# Patient Record
Sex: MALE | Race: BLACK OR AFRICAN AMERICAN | NOT HISPANIC OR LATINO | URBAN - METROPOLITAN AREA
[De-identification: names, ages, dates, MRNs, and addresses within clinical notes are randomized per-mention and may not be internally consistent; named-entity substitution may affect disease eponyms.]

---

## 2019-04-06 ENCOUNTER — INPATIENT (INPATIENT)
Facility: HOSPITAL | Age: 29
LOS: 7 days | Discharge: HOME HEALTH | End: 2019-04-14
Attending: HOSPITALIST | Admitting: HOSPITALIST
Payer: MEDICARE

## 2019-04-06 VITALS
TEMPERATURE: 101 F | DIASTOLIC BLOOD PRESSURE: 121 MMHG | OXYGEN SATURATION: 100 % | RESPIRATION RATE: 16 BRPM | HEART RATE: 113 BPM | SYSTOLIC BLOOD PRESSURE: 148 MMHG

## 2019-04-06 DIAGNOSIS — R79.89 OTHER SPECIFIED ABNORMAL FINDINGS OF BLOOD CHEMISTRY: ICD-10-CM

## 2019-04-06 DIAGNOSIS — I16.0 HYPERTENSIVE URGENCY: ICD-10-CM
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
## 2019-04-06 DIAGNOSIS — Z29.9 ENCOUNTER FOR PROPHYLACTIC MEASURES, UNSPECIFIED: ICD-10-CM

## 2019-04-06 DIAGNOSIS — E87.2 ACIDOSIS: ICD-10-CM

## 2019-04-06 DIAGNOSIS — G93.40 ENCEPHALOPATHY, UNSPECIFIED: ICD-10-CM

## 2019-04-06 DIAGNOSIS — R65.10 SYSTEMIC INFLAMMATORY RESPONSE SYNDROME (SIRS) OF NON-INFECTIOUS ORIGIN WITHOUT ACUTE ORGAN DYSFUNCTION: ICD-10-CM

## 2019-04-06 DIAGNOSIS — R50.9 FEVER, UNSPECIFIED: ICD-10-CM

## 2019-04-06 LAB
ALBUMIN SERPL ELPH-MCNC: 4 G/DL — SIGNIFICANT CHANGE UP (ref 3.3–5)
ALP SERPL-CCNC: 111 U/L — SIGNIFICANT CHANGE UP (ref 40–120)
ALT FLD-CCNC: 28 U/L — SIGNIFICANT CHANGE UP (ref 4–41)
ANION GAP SERPL CALC-SCNC: 16 MMO/L — HIGH (ref 7–14)
APPEARANCE UR: CLEAR — SIGNIFICANT CHANGE UP
AST SERPL-CCNC: 30 U/L — SIGNIFICANT CHANGE UP (ref 4–40)
B PERT DNA SPEC QL NAA+PROBE: NOT DETECTED — SIGNIFICANT CHANGE UP
BACTERIA # UR AUTO: NEGATIVE — SIGNIFICANT CHANGE UP
BASE EXCESS BLDV CALC-SCNC: -0.3 MMOL/L — SIGNIFICANT CHANGE UP
BASE EXCESS BLDV CALC-SCNC: -3.4 MMOL/L — SIGNIFICANT CHANGE UP
BASOPHILS # BLD AUTO: 0.07 K/UL — SIGNIFICANT CHANGE UP (ref 0–0.2)
BASOPHILS NFR BLD AUTO: 0.7 % — SIGNIFICANT CHANGE UP (ref 0–2)
BILIRUB SERPL-MCNC: 0.2 MG/DL — SIGNIFICANT CHANGE UP (ref 0.2–1.2)
BILIRUB UR-MCNC: NEGATIVE — SIGNIFICANT CHANGE UP
BLOOD GAS VENOUS - CREATININE: 1.22 MG/DL — SIGNIFICANT CHANGE UP (ref 0.5–1.3)
BLOOD UR QL VISUAL: SIGNIFICANT CHANGE UP
BUN SERPL-MCNC: 14 MG/DL — SIGNIFICANT CHANGE UP (ref 7–23)
C PNEUM DNA SPEC QL NAA+PROBE: NOT DETECTED — SIGNIFICANT CHANGE UP
CALCIUM SERPL-MCNC: 9.4 MG/DL — SIGNIFICANT CHANGE UP (ref 8.4–10.5)
CHLORIDE BLDV-SCNC: 110 MMOL/L — HIGH (ref 96–108)
CHLORIDE SERPL-SCNC: 108 MMOL/L — HIGH (ref 98–107)
CO2 SERPL-SCNC: 19 MMOL/L — LOW (ref 22–31)
COLOR SPEC: SIGNIFICANT CHANGE UP
CREAT SERPL-MCNC: 1.36 MG/DL — HIGH (ref 0.5–1.3)
EOSINOPHIL # BLD AUTO: 0.13 K/UL — SIGNIFICANT CHANGE UP (ref 0–0.5)
EOSINOPHIL NFR BLD AUTO: 1.2 % — SIGNIFICANT CHANGE UP (ref 0–6)
FLUAV H1 2009 PAND RNA SPEC QL NAA+PROBE: NOT DETECTED — SIGNIFICANT CHANGE UP
FLUAV H1 RNA SPEC QL NAA+PROBE: NOT DETECTED — SIGNIFICANT CHANGE UP
FLUAV H3 RNA SPEC QL NAA+PROBE: NOT DETECTED — SIGNIFICANT CHANGE UP
FLUAV SUBTYP SPEC NAA+PROBE: NOT DETECTED — SIGNIFICANT CHANGE UP
FLUBV RNA SPEC QL NAA+PROBE: NOT DETECTED — SIGNIFICANT CHANGE UP
GAS PNL BLDV: 137 MMOL/L — SIGNIFICANT CHANGE UP (ref 136–146)
GAS PNL BLDV: 140 MMOL/L — SIGNIFICANT CHANGE UP (ref 136–146)
GLUCOSE BLDV-MCNC: 114 — HIGH (ref 70–99)
GLUCOSE BLDV-MCNC: 120 — HIGH (ref 70–99)
GLUCOSE SERPL-MCNC: 110 MG/DL — HIGH (ref 70–99)
GLUCOSE UR-MCNC: NEGATIVE — SIGNIFICANT CHANGE UP
HADV DNA SPEC QL NAA+PROBE: NOT DETECTED — SIGNIFICANT CHANGE UP
HCO3 BLDV-SCNC: 21 MMOL/L — SIGNIFICANT CHANGE UP (ref 20–27)
HCO3 BLDV-SCNC: 24 MMOL/L — SIGNIFICANT CHANGE UP (ref 20–27)
HCOV PNL SPEC NAA+PROBE: SIGNIFICANT CHANGE UP
HCT VFR BLD CALC: 48.5 % — SIGNIFICANT CHANGE UP (ref 39–50)
HCT VFR BLDV CALC: 42.7 % — SIGNIFICANT CHANGE UP (ref 39–51)
HCT VFR BLDV CALC: 45.1 % — SIGNIFICANT CHANGE UP (ref 39–51)
HGB BLD-MCNC: 14.2 G/DL — SIGNIFICANT CHANGE UP (ref 13–17)
HGB BLDV-MCNC: 13.9 G/DL — SIGNIFICANT CHANGE UP (ref 13–17)
HGB BLDV-MCNC: 14.7 G/DL — SIGNIFICANT CHANGE UP (ref 13–17)
HMPV RNA SPEC QL NAA+PROBE: NOT DETECTED — SIGNIFICANT CHANGE UP
HPIV1 RNA SPEC QL NAA+PROBE: NOT DETECTED — SIGNIFICANT CHANGE UP
HPIV2 RNA SPEC QL NAA+PROBE: NOT DETECTED — SIGNIFICANT CHANGE UP
HPIV3 RNA SPEC QL NAA+PROBE: NOT DETECTED — SIGNIFICANT CHANGE UP
HPIV4 RNA SPEC QL NAA+PROBE: NOT DETECTED — SIGNIFICANT CHANGE UP
IMM GRANULOCYTES NFR BLD AUTO: 1.1 % — SIGNIFICANT CHANGE UP (ref 0–1.5)
KETONES UR-MCNC: NEGATIVE — SIGNIFICANT CHANGE UP
LACTATE BLDV-MCNC: 1.8 MMOL/L — SIGNIFICANT CHANGE UP (ref 0.5–2)
LACTATE BLDV-MCNC: 5.9 MMOL/L — CRITICAL HIGH (ref 0.5–2)
LEUKOCYTE ESTERASE UR-ACNC: NEGATIVE — SIGNIFICANT CHANGE UP
LYMPHOCYTES # BLD AUTO: 3.23 K/UL — SIGNIFICANT CHANGE UP (ref 1–3.3)
LYMPHOCYTES # BLD AUTO: 30.9 % — SIGNIFICANT CHANGE UP (ref 13–44)
MCHC RBC-ENTMCNC: 22.9 PG — LOW (ref 27–34)
MCHC RBC-ENTMCNC: 29.3 % — LOW (ref 32–36)
MCV RBC AUTO: 78.4 FL — LOW (ref 80–100)
MONOCYTES # BLD AUTO: 0.88 K/UL — SIGNIFICANT CHANGE UP (ref 0–0.9)
MONOCYTES NFR BLD AUTO: 8.4 % — SIGNIFICANT CHANGE UP (ref 2–14)
MUCOUS THREADS # UR AUTO: SIGNIFICANT CHANGE UP
NEUTROPHILS # BLD AUTO: 6.03 K/UL — SIGNIFICANT CHANGE UP (ref 1.8–7.4)
NEUTROPHILS NFR BLD AUTO: 57.7 % — SIGNIFICANT CHANGE UP (ref 43–77)
NITRITE UR-MCNC: NEGATIVE — SIGNIFICANT CHANGE UP
NRBC # FLD: 0 K/UL — SIGNIFICANT CHANGE UP (ref 0–0)
PCO2 BLDV: 41 MMHG — SIGNIFICANT CHANGE UP (ref 41–51)
PCO2 BLDV: 42 MMHG — SIGNIFICANT CHANGE UP (ref 41–51)
PH BLDV: 7.34 PH — SIGNIFICANT CHANGE UP (ref 7.32–7.43)
PH BLDV: 7.38 PH — SIGNIFICANT CHANGE UP (ref 7.32–7.43)
PH UR: 5.5 — SIGNIFICANT CHANGE UP (ref 5–8)
PLATELET # BLD AUTO: 307 K/UL — SIGNIFICANT CHANGE UP (ref 150–400)
PMV BLD: SIGNIFICANT CHANGE UP FL (ref 7–13)
PO2 BLDV: 66 MMHG — HIGH (ref 35–40)
PO2 BLDV: 80 MMHG — HIGH (ref 35–40)
POTASSIUM BLDV-SCNC: 3.6 MMOL/L — SIGNIFICANT CHANGE UP (ref 3.4–4.5)
POTASSIUM BLDV-SCNC: 3.7 MMOL/L — SIGNIFICANT CHANGE UP (ref 3.4–4.5)
POTASSIUM SERPL-MCNC: 4.8 MMOL/L — SIGNIFICANT CHANGE UP (ref 3.5–5.3)
POTASSIUM SERPL-SCNC: 4.8 MMOL/L — SIGNIFICANT CHANGE UP (ref 3.5–5.3)
PROT SERPL-MCNC: 8.1 G/DL — SIGNIFICANT CHANGE UP (ref 6–8.3)
PROT UR-MCNC: 100 — HIGH
RBC # BLD: 6.19 M/UL — HIGH (ref 4.2–5.8)
RBC # FLD: 17.2 % — HIGH (ref 10.3–14.5)
RBC CASTS # UR COMP ASSIST: SIGNIFICANT CHANGE UP (ref 0–?)
RSV RNA SPEC QL NAA+PROBE: NOT DETECTED — SIGNIFICANT CHANGE UP
RV+EV RNA SPEC QL NAA+PROBE: NOT DETECTED — SIGNIFICANT CHANGE UP
SAO2 % BLDV: 88.4 % — HIGH (ref 60–85)
SAO2 % BLDV: 95.1 % — HIGH (ref 60–85)
SODIUM SERPL-SCNC: 143 MMOL/L — SIGNIFICANT CHANGE UP (ref 135–145)
SP GR SPEC: 1.02 — SIGNIFICANT CHANGE UP (ref 1–1.04)
SQUAMOUS # UR AUTO: SIGNIFICANT CHANGE UP
UROBILINOGEN FLD QL: NORMAL — SIGNIFICANT CHANGE UP
WBC # BLD: 10.46 K/UL — SIGNIFICANT CHANGE UP (ref 3.8–10.5)
WBC # FLD AUTO: 10.46 K/UL — SIGNIFICANT CHANGE UP (ref 3.8–10.5)
WBC UR QL: SIGNIFICANT CHANGE UP (ref 0–?)

## 2019-04-06 PROCEDURE — 99223 1ST HOSP IP/OBS HIGH 75: CPT

## 2019-04-06 PROCEDURE — 70450 CT HEAD/BRAIN W/O DYE: CPT | Mod: 26

## 2019-04-06 PROCEDURE — 71046 X-RAY EXAM CHEST 2 VIEWS: CPT | Mod: 26

## 2019-04-06 RX ORDER — SODIUM CHLORIDE 9 MG/ML
1000 INJECTION, SOLUTION INTRAVENOUS ONCE
Qty: 0 | Refills: 0 | Status: COMPLETED | OUTPATIENT
Start: 2019-04-06 | End: 2019-04-06

## 2019-04-06 RX ORDER — PIPERACILLIN AND TAZOBACTAM 4; .5 G/20ML; G/20ML
3.38 INJECTION, POWDER, LYOPHILIZED, FOR SOLUTION INTRAVENOUS ONCE
Qty: 0 | Refills: 0 | Status: COMPLETED | OUTPATIENT
Start: 2019-04-06 | End: 2019-04-06

## 2019-04-06 RX ORDER — ACETAMINOPHEN 500 MG
975 TABLET ORAL ONCE
Qty: 0 | Refills: 0 | Status: COMPLETED | OUTPATIENT
Start: 2019-04-06 | End: 2019-04-06

## 2019-04-06 RX ORDER — SODIUM CHLORIDE 9 MG/ML
1000 INJECTION INTRAMUSCULAR; INTRAVENOUS; SUBCUTANEOUS ONCE
Qty: 0 | Refills: 0 | Status: COMPLETED | OUTPATIENT
Start: 2019-04-06 | End: 2019-04-06

## 2019-04-06 RX ORDER — AMLODIPINE BESYLATE 2.5 MG/1
10 TABLET ORAL DAILY
Qty: 0 | Refills: 0 | Status: DISCONTINUED | OUTPATIENT
Start: 2019-04-06 | End: 2019-04-14

## 2019-04-06 RX ORDER — AMLODIPINE BESYLATE 2.5 MG/1
1 TABLET ORAL
Qty: 0 | Refills: 0 | COMMUNITY

## 2019-04-06 RX ORDER — HYDRALAZINE HCL 50 MG
5 TABLET ORAL ONCE
Qty: 0 | Refills: 0 | Status: COMPLETED | OUTPATIENT
Start: 2019-04-06 | End: 2019-04-06

## 2019-04-06 RX ADMIN — SODIUM CHLORIDE 1000 MILLILITER(S): 9 INJECTION, SOLUTION INTRAVENOUS at 09:17

## 2019-04-06 RX ADMIN — PIPERACILLIN AND TAZOBACTAM 200 GRAM(S): 4; .5 INJECTION, POWDER, LYOPHILIZED, FOR SOLUTION INTRAVENOUS at 09:42

## 2019-04-06 RX ADMIN — SODIUM CHLORIDE 2000 MILLILITER(S): 9 INJECTION INTRAMUSCULAR; INTRAVENOUS; SUBCUTANEOUS at 11:38

## 2019-04-06 RX ADMIN — AMLODIPINE BESYLATE 10 MILLIGRAM(S): 2.5 TABLET ORAL at 17:49

## 2019-04-06 RX ADMIN — Medication 975 MILLIGRAM(S): at 09:21

## 2019-04-06 RX ADMIN — Medication 5 MILLIGRAM(S): at 23:12

## 2019-04-06 RX ADMIN — SODIUM CHLORIDE 1000 MILLILITER(S): 9 INJECTION, SOLUTION INTRAVENOUS at 09:21

## 2019-04-06 NOTE — ED ADULT TRIAGE NOTE - CHIEF COMPLAINT QUOTE
Pt brought in by EMS for fall, pt w autism nonverbal, as per family they heard a noise and were in the next room and pt was conscious, unknown trauma to head, no lacs/bruising noted, pt febrile/tachy/hypertensive in triage, as per father no cough/nv/urinary symptoms noted. Pt brought in by EMS for fall, pt w autism nonverbal, as per family they heard a noise and were in the next room and pt was conscious, unknown trauma to head, no lacs/bruising noted, pt febrile/tachy/hypertensive in triage, as per father no cough/nv/urinary symptoms noted, as per father pt mentating at baseline.

## 2019-04-06 NOTE — H&P ADULT - PROBLEM SELECTOR PLAN 5
Baseline Cr unknown. Cannot confirm JUSTIN vs. underlying CKD at this time. Pt s/p IV fluid boluses  - monitor Cr  - renally dose meds as needed.

## 2019-04-06 NOTE — ED ADULT NURSE NOTE - NSSUHOSCREENINGYN_ED_ALL_ED
Pt adm to the ED due to being referred by PMD for bradycardia, dizziness, generalized weakness and constantly falling. Pt denies chest pain. Pt also loses his balance too.
No - the patient is unable to be screened due to medical condition

## 2019-04-06 NOTE — H&P ADULT - HISTORY OF PRESENT ILLNESS
Initial ED triage VS:  temp 101.3  /121    RR 16  O2 sat 100% on RA. While in the ED, patient received Zosyn 3.375mg x1 dose, oral Tylenol 975mg, and 2L bolus of IV fluids. Mr. Grijalva is a 27 yo man with a hx of autism and HTN brought in by EMS after unwitnessed fall at home.    HPI obtained from patient's parent's who were present at bedside since pt is minimally verbal. At baseline, though the patient is non-verbal, he is able to communicate through body language and able to follow commands. He is ambulatory and independent of ADLs. As per family, he is very routine oriented. He wakes up around 5am every day despite what time he goes to bed at night, which can be very late at times. When he wakes up he showers and goes downstairs to start his day. However, earlier this morning, around 8am or so, his parents were notified by the patient's aide that the patient fell in the kitchen. His parents were initially sleeping at the time, but when they saw him, he was foaming at the mouth with his eyes rolled back and he was not responding. His family called EMS. As per his parents, the patient was altered for about 15-20 minutes. His parents report the patient was in his usually state of health yesterday and for the entire week. He had no symptoms of fever or chills. No sneezing, coughing, n/v, abdominal pain, or diarrhea. He had normal appetite. He has poor sleep hygiene at baseline. He has had no recent travel or sick contacts. Of note, his parents mentioned he was recently taken off his blood pressure medication this past month. It is unclear as to why it was stopped after he visited a physician. As per family, the patient has no PMD. He goes to his local hospital and sees a different medical provider each time. His father did mention the patient had a seizure when he was about 1 and half years old but was never placed on any anti-seizure medication or had a subsequent episode until now.     Initial ED triage VS:  temp 101.3  /121    RR 16  O2 sat 100% on RA. While in the ED, patient received Zosyn 3.375mg x1 dose, oral Tylenol 975mg, and 2L bolus of IV fluids. Currently on the medical floor, patient appears back at baseline as per his family. He has no complaints and is very interactive.

## 2019-04-06 NOTE — H&P ADULT - ASSESSMENT
Mr. Grijalva is a 27 yo man with a hx of autism and HTN brought in by EMS after unwitnessed fall at home, highly suspicious for seizure activity, found to be febrile, meeting SIRS criteria in addition to hypertensive urgency vs. hypertensive emergency. Mr. Grijalva is a 27 yo man with a hx of autism and HTN brought in by EMS after unwitnessed fall at home with acute encephalopathy, highly suspicious for seizure activity, found to be febrile, meeting SIRS criteria in addition to hypertensive urgency vs. hypertensive emergency on presentation. Pt is back to baseline with negative infectious w/u thus far.

## 2019-04-06 NOTE — ED PROVIDER NOTE - OBJECTIVE STATEMENT
27 y/o male hx of autism, non verbal, p/w unwitnessed fall, and now found to have fever. Per dad, home health aid heard a thud, went into other room and found patient on the floor, awake. No reports of shaking activity. No hx of seizures per dad. Dad and aid was unable to get patient off floor for 30 minutes 2/2 to patient habitus, so called EMS. Found to be febrile and tachycardic in triage. Dad reports cough for past 4 weeks, unable to clarify if worse over past 1 week. Denies any other symptoms, including vomiting, change in behavior. Patient has HTN, on meds. No recent travel.

## 2019-04-06 NOTE — ED ADULT NURSE NOTE - NSIMPLEMENTINTERV_GEN_ALL_ED
Implemented All Fall Risk Interventions:  [place] to call system. Call bell, personal items and telephone within reach. Instruct patient to call for assistance. Room bathroom lighting operational. Non-slip footwear when patient is off stretcher. Physically safe environment: no spills, clutter or unnecessary equipment. Stretcher in lowest position, wheels locked, appropriate side rails in place. Provide visual cue, wrist band, yellow gown, etc. Monitor gait and stability. Monitor for mental status changes and reorient to person, place, and time. Review medications for side effects contributing to fall risk. Reinforce activity limits and safety measures with patient and family.

## 2019-04-06 NOTE — ED ADULT NURSE NOTE - CHIEF COMPLAINT QUOTE
Pt brought in by EMS for fall, pt w autism nonverbal, as per family they heard a noise and were in the next room and pt was conscious, unknown trauma to head, no lacs/bruising noted, pt febrile/tachy/hypertensive in triage, as per father no cough/nv/urinary symptoms noted, as per father pt mentating at baseline.

## 2019-04-06 NOTE — H&P ADULT - PROBLEM SELECTOR PLAN 2
Patient met criteria on presentation. Likely due to acute seizure. However, will need to r/o sepsis. Pt s/p dose of Zosyn in the ED.  - f/u blood cultures x2 and urine cx  - hold further abx since pt hemodynamically stable and asymptomatic. Monitor closely. If pt decompensates clinically, would restart empiric abx.

## 2019-04-06 NOTE — ED PROVIDER NOTE - CLINICAL SUMMARY MEDICAL DECISION MAKING FREE TEXT BOX
Rush Rae (Resident): 29 y/o, autistic, w/ fall - patient normally ambulatory, but dad was unable to get patient off floor, small tongue lac and now fever - neck supple, full ROM, looks well, low concern for meningitis - given fall, fever, and tongue lac, concern for poss seizure - will CT head, labs, septic workup, and reassess

## 2019-04-06 NOTE — H&P ADULT - NSHPLABSRESULTS_GEN_ALL_CORE
LABS:                        14.2   10.46 )-----------( 307      ( 2019 09:00 )             48.5     -    143  |  108<H>  |  14  ----------------------------<  110<H>  4.8   |  19<L>  |  1.36<H>    Ca    9.4      2019 09:00    TPro  8.1  /  Alb  4.0  /  TBili  0.2  /  DBili  x   /  AST  30  /  ALT  28  /  AlkPhos  111        Urinalysis Basic - ( 2019 09:52 )    Color: LIGHT YELLOW / Appearance: CLEAR / S.016 / pH: 5.5  Gluc: NEGATIVE / Ketone: NEGATIVE  / Bili: NEGATIVE / Urobili: NORMAL   Blood: SMALL / Protein: 100 / Nitrite: NEGATIVE   Leuk Esterase: NEGATIVE / RBC: 0-2 / WBC 3-5   Sq Epi: OCC / Non Sq Epi: x / Bacteria: NEGATIVE        VBG  @ 12:50  pH: 7.38/pCO2: 42 /pO2: 80/HCO3: 24/lactate: 1.8  VBG  @ 09:00  pH: 7.34/pCO2: 41 /pO2: 66/HCO3: 21/lactate: 5.9

## 2019-04-06 NOTE — H&P ADULT - NSHPSOCIALHISTORY_GEN_ALL_CORE
Patient lives at home with his parents. He is has a home health aide. He is otherwise independent of ADLs. He has no tobacco, EtOH, or illicit drug use history.

## 2019-04-06 NOTE — ED PROVIDER NOTE - ATTENDING CONTRIBUTION TO CARE
Attending note:   After face to face evaluation of this patient, I concur with above noted hx, pe, and care plan for this patient.  27 y/o M with autism, htn, non-verbal, found on floor, febrile here, acting at baseline.  Lungs clear, abd. soft, patient at baseline of activity according to father.    Evaluation in progress

## 2019-04-06 NOTE — H&P ADULT - PROBLEM SELECTOR PLAN 1
Now resolved. Patient at baseline mentation. History and exam most c/w seizure activity though. Unclear etiology for precipitating factor. Given elevated BP, seizure may have been manifestation of hypertensive emergency in the setting of discontinued antihypertensive meds. Possible occult infection given fever, but pt with normal WBC, negative UA, CXR and RVP and has no localizing symptoms. No electrolyte abnormalities. CT head negative for acute mass, infarct or hemorrhage.   - obtain EEG   - monitor closely for seizure activity while inpatient  - treat HTN as described below

## 2019-04-06 NOTE — H&P ADULT - PROBLEM SELECTOR PLAN 3
Elevated BP likely due to recent discontinuation of BP meds, amlodipine/HCTZ.  - resume amlodipine 10mg daily for now. If does not adequately control BP, will also restart HCTZ  - monitor BP and mental status

## 2019-04-06 NOTE — ED PROVIDER NOTE - MUSCULOSKELETAL, MLM
Strength appropriate for age and habitus. Full active range of motion of all 4 extremities. No joint swelling, calor, or deformities. No calf swelling or tenderness. Neck supple

## 2019-04-06 NOTE — H&P ADULT - PROBLEM SELECTOR PLAN 6
IMPROVE VTE Individual Risk Assessment        RISK                                                          Points  [  ] Previous VTE                                               3  [  ] Thrombophilia                                            2  [  ] Lower limb paresis/paralysis                    2    [  ] Active Cancer (in last 6 months)              2   [  ] Immobilization > 24 hrs                             1  [  ] ICU/CCU stay > 24 hours                            1  [  ] Age > 60                                                        1                                            Total Score __0_______  - encourage ambulation for DVT ppx

## 2019-04-06 NOTE — H&P ADULT - PROBLEM SELECTOR PLAN 4
Noted increase in lactate, which is likely byproduct of seizure activity. However, cannot exclude infectious source. Lactate now WNL after IV fluids.  - monitor bicarb level

## 2019-04-06 NOTE — ED ADULT NURSE NOTE - OBJECTIVE STATEMENT
28 year old autistic male, lives at home with his parents, parents heard a "thud" and went into the pts room where he was awake and on the floor no LOC   Pt found to be febrile in triage. Pt awake and cooperative, appears well. PIV placed Septic workup done. Cardiac monitor on, ST noted EKG completed. PT medicated with tylenol as ordered, IV fluids given. Plan of care discussed with pts father. Will continue to monitor

## 2019-04-07 LAB
ANION GAP SERPL CALC-SCNC: 16 MMO/L — HIGH (ref 7–14)
BUN SERPL-MCNC: 12 MG/DL — SIGNIFICANT CHANGE UP (ref 7–23)
CALCIUM SERPL-MCNC: 9.7 MG/DL — SIGNIFICANT CHANGE UP (ref 8.4–10.5)
CHLORIDE SERPL-SCNC: 106 MMOL/L — SIGNIFICANT CHANGE UP (ref 98–107)
CK MB BLD-MCNC: 0.2 — SIGNIFICANT CHANGE UP (ref 0–2.5)
CK MB BLD-MCNC: 4.83 NG/ML — SIGNIFICANT CHANGE UP (ref 1–6.6)
CK SERPL-CCNC: 2679 U/L — HIGH (ref 30–200)
CO2 SERPL-SCNC: 20 MMOL/L — LOW (ref 22–31)
CREAT SERPL-MCNC: 1.29 MG/DL — SIGNIFICANT CHANGE UP (ref 0.5–1.3)
GLUCOSE SERPL-MCNC: 82 MG/DL — SIGNIFICANT CHANGE UP (ref 70–99)
HCT VFR BLD CALC: 46.8 % — SIGNIFICANT CHANGE UP (ref 39–50)
HGB BLD-MCNC: 14.2 G/DL — SIGNIFICANT CHANGE UP (ref 13–17)
MAGNESIUM SERPL-MCNC: 2.2 MG/DL — SIGNIFICANT CHANGE UP (ref 1.6–2.6)
MCHC RBC-ENTMCNC: 23.5 PG — LOW (ref 27–34)
MCHC RBC-ENTMCNC: 30.3 % — LOW (ref 32–36)
MCV RBC AUTO: 77.6 FL — LOW (ref 80–100)
NRBC # FLD: 0 K/UL — SIGNIFICANT CHANGE UP (ref 0–0)
PHOSPHATE SERPL-MCNC: 4 MG/DL — SIGNIFICANT CHANGE UP (ref 2.5–4.5)
PLATELET # BLD AUTO: 289 K/UL — SIGNIFICANT CHANGE UP (ref 150–400)
PMV BLD: SIGNIFICANT CHANGE UP FL (ref 7–13)
POTASSIUM SERPL-MCNC: 4.1 MMOL/L — SIGNIFICANT CHANGE UP (ref 3.5–5.3)
POTASSIUM SERPL-SCNC: 4.1 MMOL/L — SIGNIFICANT CHANGE UP (ref 3.5–5.3)
RBC # BLD: 6.03 M/UL — HIGH (ref 4.2–5.8)
RBC # FLD: 17.3 % — HIGH (ref 10.3–14.5)
SODIUM SERPL-SCNC: 142 MMOL/L — SIGNIFICANT CHANGE UP (ref 135–145)
SPECIMEN SOURCE: SIGNIFICANT CHANGE UP
WBC # BLD: 8.75 K/UL — SIGNIFICANT CHANGE UP (ref 3.8–10.5)
WBC # FLD AUTO: 8.75 K/UL — SIGNIFICANT CHANGE UP (ref 3.8–10.5)

## 2019-04-07 PROCEDURE — 99233 SBSQ HOSP IP/OBS HIGH 50: CPT

## 2019-04-07 PROCEDURE — 95819 EEG AWAKE AND ASLEEP: CPT | Mod: 26

## 2019-04-07 RX ORDER — SODIUM CHLORIDE 9 MG/ML
1000 INJECTION, SOLUTION INTRAVENOUS
Qty: 0 | Refills: 0 | Status: DISCONTINUED | OUTPATIENT
Start: 2019-04-07 | End: 2019-04-08

## 2019-04-07 RX ORDER — DIAZEPAM 5 MG
0.5 TABLET ORAL ONCE
Qty: 0 | Refills: 0 | Status: DISCONTINUED | OUTPATIENT
Start: 2019-04-07 | End: 2019-04-08

## 2019-04-07 RX ADMIN — AMLODIPINE BESYLATE 10 MILLIGRAM(S): 2.5 TABLET ORAL at 06:46

## 2019-04-07 RX ADMIN — SODIUM CHLORIDE 120 MILLILITER(S): 9 INJECTION, SOLUTION INTRAVENOUS at 23:37

## 2019-04-07 RX ADMIN — SODIUM CHLORIDE 120 MILLILITER(S): 9 INJECTION, SOLUTION INTRAVENOUS at 13:58

## 2019-04-07 NOTE — PROGRESS NOTE ADULT - ATTENDING COMMENTS
eeg   Neuro appreciated eeg   f/u cult eeg   f/u cult  Neurology Consult called eeg   f/u cult  Neurology Consult called    Rhabdo cpk [id number], ivf hydration and monitor cpk in am

## 2019-04-07 NOTE — CONSULT NOTE ADULT - ATTENDING COMMENTS
Patient seen and examined with neurology team and above note reviewed and I agree with assessment and plan as outlined. Patient with hx of autism and presented after a fall at home and found to have elevated BP and was febrile.  Exam shows he is able to follow requests however he does not verbalize very much but can express himself.  No focal weakness, or sensory loss and he can ambulate without assistance.   EEG routine reviewed and was normal.   His event appears consistent with a potential seizure however will arrange for MRI brain with TLE protocol and more prolonged EEG monitoring.  Continue seizure precautions and medical mgt and supportive care and all questions answered with mother at bedside.

## 2019-04-07 NOTE — PROGRESS NOTE ADULT - ASSESSMENT
Mr. Grijalva is a 29 yo man with a hx of autism and HTN brought in by EMS after unwitnessed fall at home with acute encephalopathy, highly suspicious for seizure activity, found to be febrile, meeting SIRS criteria in addition to hypertensive urgency vs. hypertensive emergency on presentation. Pt is back to baseline with negative infectious w/u thus far.

## 2019-04-07 NOTE — CONSULT NOTE ADULT - ASSESSMENT
29 y/o Filipino male with past medical history of Autism and HTN admitted for unwitnessed fall w/ concern for seizure in the setting of SIRS and hypertensive urgency. Neurologic exam demonstrates anisocoria of L pupil w/ size of 6mm though both are reactive. Patient is non-verbal though able to communicate with gestures and writing. CT head unremarkable for acute pathology. In the ED, patient was noted to be febrile to 101.3F w/ lactate of 5.8 and tachycardic.     Impression: GTC seizure 2/2 unknown etiology; r/o toxic/metabolic etiology; r/o intracranial pathology; ischemia is possible, though less likely, in the setting of hypertension (due to medication non-compliance) and can present as seizure.    Recommendations:  [] Follow-up rEEG results  [] MRI Brain w/o contrast (Epilepsy Protocol)      Plan discussed with Neurology Attending. 29 y/o [place] male with past medical history of Autism and HTN admitted for unwitnessed fall w/ concern for seizure in the setting of SIRS and hypertensive urgency. Neurologic exam demonstrates anisocoria of L pupil w/ size of 5mm though both are reactive. Patient is non-verbal though able to communicate with gestures and writing. CT head unremarkable for acute pathology. In the ED, patient was noted to be febrile to 101.3F w/ lactate of 5.8 and tachycardic.     Impression: GTC seizure 2/2 unknown etiology; r/o toxic/metabolic etiology; r/o intracranial pathology; ischemia is possible, though less likely, in the setting of hypertension (due to medication non-compliance) and can present as seizure.    Recommendations:  [] Follow-up rEEG results  [] MRI Brain w/o contrast (Epilepsy Protocol)      Plan discussed with Neurology Attending. 29 y/o [place] Island[place] male with past medical history of Autism and HTN admitted for unwitnessed fall w/ concern for seizure in the setting of SIRS and hypertensive urgency. Neurologic exam demonstrates anisocoria of L pupil w/ size of 5mm though both are reactive. Patient is non-verbal though able to communicate with gestures and writing. CT head unremarkable for acute pathology. In the ED, patient was noted to be febrile to 101.3F w/ lactate of 5.8 and tachycardic.     Impression: GTC seizure 2/2 unknown etiology; r/o toxic/metabolic etiology; r/o intracranial pathology; ischemia is possible, though less likely, in the setting of hypertension (due to medication non-compliance) and can present as seizure.    Recommendations:  [] Follow-up rEEG results  [] MRI Brain w/ and w/o contrast (Temporal Lobe Epilepsy Protocol)      Plan discussed with Neurology Attending. 27 y/o [place] male with past medical history of Autism and HTN admitted for unwitnessed fall w/ concern for seizure in the setting of SIRS and hypertensive urgency. Neurologic exam demonstrates anisocoria of L pupil w/ size of 5mm though both are reactive. Patient is non-verbal though able to communicate with gestures and writing. CT head unremarkable for acute pathology. In the ED, patient was noted to be febrile to 101.3F w/ lactate of 5.8 and tachycardic.     Impression: GTC seizure 2/2 unknown etiology; r/o toxic/metabolic etiology; r/o intracranial pathology; ischemia is possible, though less likely, in the setting of hypertension (due to medication non-compliance) and can present as seizure.    Recommendations:  [] Follow-up rEEG results  [] MRI Brain w/ and w/o contrast (Temporal Lobe Epilepsy Protocol)  [] Then more prolonged EEG monitoring    Plan discussed with Neurology Attending.

## 2019-04-07 NOTE — PROGRESS NOTE ADULT - SUBJECTIVE AND OBJECTIVE BOX
Patient is a 28y old  Male who presents with a chief complaint of Unwitnessed fall at home (2019 15:24)      SUBJECTIVE / OVERNIGHT EVENTS:    MEDICATIONS  (STANDING):  amLODIPine   Tablet 10 milliGRAM(s) Oral daily    MEDICATIONS  (PRN):        Vital Signs Last 24 Hrs  T(C): 36.8 (2019 06:44), Max: 37.2 (2019 20:59)  T(F): 98.3 (2019 06:44), Max: 98.9 (2019 20:59)  HR: 99 (2019 06:44) (94 - 102)  BP: 160/106 (2019 06:44) (140/93 - 160/106)  BP(mean): 117 (2019 15:24) (117 - 117)  RR: 20 (2019 06:44) (18 - 20)  SpO2: 99% (2019 06:44) (99% - 100%)      PHYSICAL EXAM:  GENERAL: NAD, well-developed  HEAD:  Atraumatic, Normocephalic  EYES: EOMI, PERRLA, conjunctiva and sclera clear  NECK: Supple, No JVD  CHEST/LUNG: Clear to auscultation bilaterally; No wheeze  HEART: Regular rate and rhythm; No murmurs, rubs, or gallops  ABDOMEN: Soft, Nontender, Nondistended; Bowel sounds present  EXTREMITIES:  2+ Peripheral Pulses, No clubbing, cyanosis, or edema  PSYCH: AAOx3  NEUROLOGY: non-focal  SKIN: No rashes or lesions    LABS:                        14.2   8.75  )-----------( 289      ( 2019 06:40 )             46.8     04-07    142  |  106  |  12  ----------------------------<  82  4.1   |  20<L>  |  1.29    Ca    9.7      2019 06:40  Phos  4.0     04-07  Mg     2.2     04-07    TPro  8.1  /  Alb  4.0  /  TBili  0.2  /  DBili  x   /  AST  30  /  ALT  28  /  AlkPhos  111  04-06      CARDIAC MARKERS ( 2019 06:40 )  x     / x     / 2679 u/L / 4.83 ng/mL / x          Urinalysis Basic - ( 2019 09:52 )    Color: LIGHT YELLOW / Appearance: CLEAR / S.016 / pH: 5.5  Gluc: NEGATIVE / Ketone: NEGATIVE  / Bili: NEGATIVE / Urobili: NORMAL   Blood: SMALL / Protein: 100 / Nitrite: NEGATIVE   Leuk Esterase: NEGATIVE / RBC: 0-2 / WBC 3-5   Sq Epi: OCC / Non Sq Epi: x / Bacteria: NEGATIVE        RADIOLOGY & ADDITIONAL TESTS:  < from: CT Head No Cont (19 @ 09:45) >  Impression:  Unremarkable noncontrast head CT.      < from: Xray Chest 2 Views PA/Lat (19 @ 09:32) >  IMPRESSION:   Clear lungs.            Imaging Personally Reviewed:    Consultant(s) Notes Reviewed:      Care Discussed with Consultants/Other Providers:

## 2019-04-07 NOTE — CONSULT NOTE ADULT - SUBJECTIVE AND OBJECTIVE BOX
RONALD [id number] is a 28y old  Male who presents with a chief complaint of Unwitnessed fall at home (07 Apr 2019 11:16)    History obtained from EMR and patient's parents.     HPI: 29 y/o [place] male with past medical history of Autism and HTN presented to the ED after unwitnessed fall at home in the setting of high blood pressure w/ concern for seizures. Admitted for SIRS and Hypertensive urgency.   Neurology consulted for possible seizure.   As per patient's parents, patient reportedly fell around 7:30am on 4/6 morning. Unclear if head trauma as fall was witnessed by aide and not by parents. Patient was then reportedly shaking both his upper and lower extremities and foaming at the mouth for about 20 seconds and was unresponsive during this time.  Patient was reportedly confused for 30 minutes after this event. No tongue biting. No urinary incontinence. Patient does not have a history of seizures and his last seizure was at age 2.5 in the setting of fever.   As per family, patient has been in his usual state of health for the past week without fevers, cough or other infectious signs. Of note, patient did not take his hypertensive medications for the last month because there was no refill available at the pharmacy.   In the ED, patient was noted to be febrile to 101.3F w/ lactate of 5.8 and tachycardic.   At the time of my interview, patient's family states patient is back to his baseline and wants to go home.       MEDICATIONS  (STANDING):  amLODIPine   Tablet 10 milliGRAM(s) Oral daily  dextrose 5%. 1000 milliLiter(s) (120 mL/Hr) IV Continuous <Continuous>    MEDICATIONS  (PRN):    PAST MEDICAL & SURGICAL HISTORY:  Hypertension  Autism  No significant past surgical history    FAMILY HISTORY:  Family history of hypertension: Father    Allergies    No Known Allergies    Intolerances        SHx - No smoking, No ETOH, No drug abuse      Review of Systems:  As per HPI, otherwise negative.     Vital Signs Last 24 Hrs  T(C): 36.8 (07 Apr 2019 06:44), Max: 37.2 (06 Apr 2019 20:59)  T(F): 98.3 (07 Apr 2019 06:44), Max: 98.9 (06 Apr 2019 20:59)  HR: 99 (07 Apr 2019 06:44) (94 - 102)  BP: 160/106 (07 Apr 2019 06:44) (143/99 - 160/106)  BP(mean): 117 (06 Apr 2019 15:24) (117 - 117)  RR: 20 (07 Apr 2019 06:44) (18 - 20)  SpO2: 99% (07 Apr 2019 06:44) (99% - 100%)    General Exam:   General appearance: No acute distress              Neurological Exam:  Mental Status: Awake, alert. Making grunting noises and pointing at the clock. Attention intact. Able to follow commands.   Cranial Nerves:   L pupil 4mm and briskly reactive. R pupil 6mm and reactive. EOMI, +Blink to threat bilaterally. No nystagmus.  No facial asymmetry.      Motor:   Tone: normal.                  Strength:     [] Upper extremity                      Delt       Bicep    Tricep                                                  R         5/5        5/5        5/5       5/5                                               L          5/5        5/5        5/5       5/5  [] Lower extremity                       HF          KE          KF        DF         PF                                               R        5/5        5/5        5/5       5/5       5/5                                               L         5/5        5/5       5/5       5/5        5/5  Pronator drift: none                 Dysmetria: None to finger-nose-finger   Tremor: No resting, postural or action tremor.  No myoclonus.    Sensation: intact to light touch    Deep Tendon Reflexes:     Biceps          Triceps      BR        Patellar        Ankle         Babinski                                         R       2+                   2+           2+            2+               2+           downgoing                                         L        2+                  2+           2+            2+               2+           downgoing    Gait: Deferred    Other:    04-07    142  |  106  |  12  ----------------------------<  82  4.1   |  20<L>  |  1.29    Ca    9.7      07 Apr 2019 06:40  Phos  4.0     04-07  Mg     2.2     04-07    TPro  8.1  /  Alb  4.0  /  TBili  0.2  /  DBili  x   /  AST  30  /  ALT  28  /  AlkPhos  111  04-06                            14.2   8.75  )-----------( 289      ( 07 Apr 2019 06:40 )             46.8       Radiology    CT: < from: CT Head No Cont (04.06.19 @ 09:45) >  Impression:  Unremarkable noncontrast head CT.          < end of copied text >    MRI  EKG:  tele:  TTE:  EEG: RONALD [id number] is a 28y old  Male who presents with a chief complaint of Unwitnessed fall at home (07 Apr 2019 11:16)    History obtained from EMR and patient's parents.     HPI: 29 y/o [place] male with past medical history of Autism and HTN presented to the ED after unwitnessed fall at home in the setting of high blood pressure w/ concern for seizures. Admitted for SIRS and Hypertensive urgency.   Neurology consulted for possible seizure.   As per patient's parents, patient reportedly fell around 7:30am on 4/6 morning. Unclear if head trauma as fall was witnessed by aide and not by parents. Patient was then reportedly shaking both his upper and lower extremities and foaming at the mouth for about 20 seconds and was unresponsive during this time.  Patient was reportedly confused for 30 minutes after this event. No tongue biting. No urinary incontinence. Patient does not have a history of seizures and his last seizure was at age 2.5 in the setting of fever.   As per family, patient has been in his usual state of health for the past week without fevers, cough or other infectious signs. Of note, patient did not take his hypertensive medications for the last month because there was no refill available at the pharmacy.   In the ED, patient was noted to be febrile to 101.3F w/ lactate of 5.8 and tachycardic.   At the time of my interview, patient's family states patient is back to his baseline and wants to go home.       MEDICATIONS  (STANDING):  amLODIPine   Tablet 10 milliGRAM(s) Oral daily  dextrose 5%. 1000 milliLiter(s) (120 mL/Hr) IV Continuous <Continuous>    MEDICATIONS  (PRN):    PAST MEDICAL & SURGICAL HISTORY:  Hypertension  Autism  No significant past surgical history    FAMILY HISTORY:  Family history of hypertension: Father    Allergies    No Known Allergies    Intolerances        SHx - No smoking, No ETOH, No drug abuse      Review of Systems:  As per HPI, otherwise negative.     Vital Signs Last 24 Hrs  T(C): 36.8 (07 Apr 2019 06:44), Max: 37.2 (06 Apr 2019 20:59)  T(F): 98.3 (07 Apr 2019 06:44), Max: 98.9 (06 Apr 2019 20:59)  HR: 99 (07 Apr 2019 06:44) (94 - 102)  BP: 160/106 (07 Apr 2019 06:44) (143/99 - 160/106)  BP(mean): 117 (06 Apr 2019 15:24) (117 - 117)  RR: 20 (07 Apr 2019 06:44) (18 - 20)  SpO2: 99% (07 Apr 2019 06:44) (99% - 100%)    General Exam:   General appearance: No acute distress              Neurological Exam:  Mental Status: Awake, alert. Making grunting noises and pointing at the clock. Attention intact. Able to follow commands.   Cranial Nerves:   L pupil 4mm and briskly reactive. R pupil 5mm and reactive. EOMI, +Blink to threat bilaterally. No nystagmus.  No facial asymmetry.      Motor:   Tone: normal.                  Strength:     [] Upper extremity                      Delt       Bicep    Tricep                                                  R         5/5        5/5        5/5       5/5                                               L          5/5        5/5        5/5       5/5  [] Lower extremity                       HF          KE          KF        DF         PF                                               R        5/5        5/5        5/5       5/5       5/5                                               L         5/5        5/5       5/5       5/5        5/5  Pronator drift: none                 Dysmetria: None to finger-nose-finger   Tremor: No resting, postural or action tremor.  No myoclonus.    Sensation: intact to light touch    Deep Tendon Reflexes:     Biceps          Triceps      BR        Patellar        Ankle         Babinski                                         R       2+                   2+           2+            2+               2+           downgoing                                         L        2+                  2+           2+            2+               2+           downgoing    Gait: Deferred    Other:    04-07    142  |  106  |  12  ----------------------------<  82  4.1   |  20<L>  |  1.29    Ca    9.7      07 Apr 2019 06:40  Phos  4.0     04-07  Mg     2.2     04-07    TPro  8.1  /  Alb  4.0  /  TBili  0.2  /  DBili  x   /  AST  30  /  ALT  28  /  AlkPhos  111  04-06                            14.2   8.75  )-----------( 289      ( 07 Apr 2019 06:40 )             46.8       Radiology    CT: < from: CT Head No Cont (04.06.19 @ 09:45) >  Impression:  Unremarkable noncontrast head CT.          < end of copied text >    MRI  EKG:  tele:  TTE:  EEG:

## 2019-04-08 DIAGNOSIS — M62.82 RHABDOMYOLYSIS: ICD-10-CM

## 2019-04-08 LAB
ANION GAP SERPL CALC-SCNC: 10 MMO/L — SIGNIFICANT CHANGE UP (ref 7–14)
ANION GAP SERPL CALC-SCNC: 11 MMO/L — SIGNIFICANT CHANGE UP (ref 7–14)
BACTERIA UR CULT: SIGNIFICANT CHANGE UP
BUN SERPL-MCNC: 14 MG/DL — SIGNIFICANT CHANGE UP (ref 7–23)
BUN SERPL-MCNC: 16 MG/DL — SIGNIFICANT CHANGE UP (ref 7–23)
CALCIUM SERPL-MCNC: 9.1 MG/DL — SIGNIFICANT CHANGE UP (ref 8.4–10.5)
CALCIUM SERPL-MCNC: 9.2 MG/DL — SIGNIFICANT CHANGE UP (ref 8.4–10.5)
CHLORIDE SERPL-SCNC: 103 MMOL/L — SIGNIFICANT CHANGE UP (ref 98–107)
CHLORIDE SERPL-SCNC: 104 MMOL/L — SIGNIFICANT CHANGE UP (ref 98–107)
CK SERPL-CCNC: 3745 U/L — HIGH (ref 30–200)
CK SERPL-CCNC: 6486 U/L — HIGH (ref 30–200)
CO2 SERPL-SCNC: 21 MMOL/L — LOW (ref 22–31)
CO2 SERPL-SCNC: 26 MMOL/L — SIGNIFICANT CHANGE UP (ref 22–31)
CREAT SERPL-MCNC: 1.36 MG/DL — HIGH (ref 0.5–1.3)
CREAT SERPL-MCNC: 1.38 MG/DL — HIGH (ref 0.5–1.3)
GLUCOSE SERPL-MCNC: 110 MG/DL — HIGH (ref 70–99)
GLUCOSE SERPL-MCNC: 93 MG/DL — SIGNIFICANT CHANGE UP (ref 70–99)
HCT VFR BLD CALC: 46.9 % — SIGNIFICANT CHANGE UP (ref 39–50)
HGB BLD-MCNC: 14.2 G/DL — SIGNIFICANT CHANGE UP (ref 13–17)
MCHC RBC-ENTMCNC: 23.4 PG — LOW (ref 27–34)
MCHC RBC-ENTMCNC: 30.3 % — LOW (ref 32–36)
MCV RBC AUTO: 77.4 FL — LOW (ref 80–100)
NRBC # FLD: 0 K/UL — SIGNIFICANT CHANGE UP (ref 0–0)
PLATELET # BLD AUTO: 286 K/UL — SIGNIFICANT CHANGE UP (ref 150–400)
PMV BLD: SIGNIFICANT CHANGE UP FL (ref 7–13)
POTASSIUM SERPL-MCNC: 4.2 MMOL/L — SIGNIFICANT CHANGE UP (ref 3.5–5.3)
POTASSIUM SERPL-MCNC: 4.3 MMOL/L — SIGNIFICANT CHANGE UP (ref 3.5–5.3)
POTASSIUM SERPL-SCNC: 4.2 MMOL/L — SIGNIFICANT CHANGE UP (ref 3.5–5.3)
POTASSIUM SERPL-SCNC: 4.3 MMOL/L — SIGNIFICANT CHANGE UP (ref 3.5–5.3)
RBC # BLD: 6.06 M/UL — HIGH (ref 4.2–5.8)
RBC # FLD: 18.1 % — HIGH (ref 10.3–14.5)
SODIUM SERPL-SCNC: 135 MMOL/L — SIGNIFICANT CHANGE UP (ref 135–145)
SODIUM SERPL-SCNC: 140 MMOL/L — SIGNIFICANT CHANGE UP (ref 135–145)
WBC # BLD: 6.74 K/UL — SIGNIFICANT CHANGE UP (ref 3.8–10.5)
WBC # FLD AUTO: 6.74 K/UL — SIGNIFICANT CHANGE UP (ref 3.8–10.5)

## 2019-04-08 PROCEDURE — 99222 1ST HOSP IP/OBS MODERATE 55: CPT | Mod: GC

## 2019-04-08 PROCEDURE — 99233 SBSQ HOSP IP/OBS HIGH 50: CPT

## 2019-04-08 RX ORDER — SODIUM CHLORIDE 9 MG/ML
1000 INJECTION INTRAMUSCULAR; INTRAVENOUS; SUBCUTANEOUS
Qty: 0 | Refills: 0 | Status: DISCONTINUED | OUTPATIENT
Start: 2019-04-08 | End: 2019-04-10

## 2019-04-08 RX ORDER — DIAZEPAM 5 MG
2 TABLET ORAL ONCE
Qty: 0 | Refills: 0 | Status: DISCONTINUED | OUTPATIENT
Start: 2019-04-08 | End: 2019-04-09

## 2019-04-08 RX ORDER — SODIUM CHLORIDE 9 MG/ML
1000 INJECTION INTRAMUSCULAR; INTRAVENOUS; SUBCUTANEOUS
Qty: 0 | Refills: 0 | Status: DISCONTINUED | OUTPATIENT
Start: 2019-04-08 | End: 2019-04-08

## 2019-04-08 RX ADMIN — SODIUM CHLORIDE 120 MILLILITER(S): 9 INJECTION, SOLUTION INTRAVENOUS at 06:32

## 2019-04-08 RX ADMIN — AMLODIPINE BESYLATE 10 MILLIGRAM(S): 2.5 TABLET ORAL at 06:32

## 2019-04-08 NOTE — CONSULT NOTE ADULT - SUBJECTIVE AND OBJECTIVE BOX
HPI: Mr. Grijalva is a 28 year-old man with history of autism, hypertension, and distant history of seizures, brought in 4/6/19 to the [facility] ER after seizure/unwitnessed fall    PAST MEDICAL & SURGICAL HISTORY:  Hypertension  Autism  Seizure  No significant past surgical history    Allergies  No Known Allergies    SOCIAL HISTORY:  Denies ETOh,Smoking,     FAMILY HISTORY:  Family history of hypertension: Father    REVIEW OF SYSTEMS:  CONSTITUTIONAL: No weakness, fevers or chills  EYES/ENT: No visual changes;  No vertigo or throat pain   NECK: No pain or stiffness  RESPIRATORY: No cough, wheezing, hemoptysis; No shortness of breath  CARDIOVASCULAR: No chest pain or palpitations  GASTROINTESTINAL: No abdominal or epigastric pain. No nausea, vomiting, or hematemesis; No diarrhea or constipation. No melena or hematochezia.  GENITOURINARY: No dysuria, frequency or hematuria  NEUROLOGICAL: No numbness or weakness  SKIN: No itching, burning, rashes, or lesions   All other review of systems is negative unless indicated above.    VITAL:  T(C): , Max: 37.2 (04-07-19 @ 14:05)  T(F): , Max: 98.9 (04-07-19 @ 14:05)  HR: 86 (04-08-19 @ 06:31)  BP: 147/86 (04-08-19 @ 06:31)  BP(mean): --  RR: 20 (04-08-19 @ 06:31)  SpO2: 100% (04-08-19 @ 06:31)    PHYSICAL EXAM:  Constitutional: NAD, Alert  HEENT: NCAT, MMM  Neck: Supple, No JVD  Respiratory: CTA-b/l  Cardiovascular: RRR s1s2, no m/r/g  Gastrointestinal: BS+, soft, NT/ND  Extremities: No peripheral edema b/l  Neurological: no focal deficits; strength grossly intact  Back: no CVAT b/l  Skin: No rashes, no nevi    LABS:                        14.2   6.74  )-----------( 286      ( 08 Apr 2019 06:50 )             46.9     Na(135)/K(4.3)/Cl(103)/HCO3(21)/BUN(16)/Cr(1.38)Glu(93)/Ca(9.1)/Mg(--)/PO4(--)    04-08 @ 06:50  Na(142)/K(4.1)/Cl(106)/HCO3(20)/BUN(12)/Cr(1.29)Glu(82)/Ca(9.7)/Mg(2.2)/PO4(4.0)    04-07 @ 06:40  Na(143)/K(4.8)/Cl(108)/HCO3(19)/BUN(14)/Cr(1.36)Glu(110)/Ca(9.4)/Mg(--)/PO4(--)    04-06 @ 09:00    CPK 3745 (4/8) <== 2679 (4/7)  IMAGING:  < from: CT Head No Cont (04.06.19 @ 09:45) >  Unremarkable noncontrast head CT.    ASSESSMENT:  (1)Rhabdomyolysis - due to seizure/fall - CPK trending up - needs high-rate isotonic fluid to drive down the CPK  (2)HTN - mild/acceptable for now, on high-dose Norvasc    RECOMMEND:  (1)Agree with NS 150cc/h for now  (2)Norvasc as ordered  (3)CPK q12h; if CPK is >4000 on next check, would then increase the IVF to 200cc/h  (4)BMP daily    Thank you for involving [facility] in this patient's care.    With warm regards,    Jonah Powell MD   [facility]  [phone] HPI: Mr. Grijalva is a 28 year-old man with history of autism, hypertension, and distant history of seizures, brought in 4/6/19 to the [facility] ER after seizure/unwitnessed fall. He was noted yesterday to have an elevated CPK in the 2000s; he was placed on d5w@120cc/h yesterday; as of today, his CPK is up into the 3000s. Therefore, a renal consultation was requested.    history from patient is limited to his autism/developmental delay.    PAST MEDICAL & SURGICAL HISTORY:  Hypertension  Autism  Seizure  No significant past surgical history    Allergies  No Known Allergies    SOCIAL HISTORY:  Denies ETOh,Smoking,     FAMILY HISTORY:  Family history of hypertension: Father    REVIEW OF SYSTEMS:  unable to obtain from patient - he is unable to answer simple questions appropriately.    VITAL:  T(C): , Max: 37.2 (04-07-19 @ 14:05)  T(F): , Max: 98.9 (04-07-19 @ 14:05)  HR: 86 (04-08-19 @ 06:31)  BP: 147/86 (04-08-19 @ 06:31)  BP(mean): --  RR: 20 (04-08-19 @ 06:31)  SpO2: 100% (04-08-19 @ 06:31)    PHYSICAL EXAM:  Constitutional: NAD, overweight; garbled/unintelligible speech  HEENT: NCAT, DMM  Neck: Supple, No JVD  Respiratory: CTA-b/l  Cardiovascular: RRR s1s2, no m/r/g  Gastrointestinal: BS+, soft, NT/ND  Extremities: No peripheral edema b/l  Neurological: no focal deficits; strength grossly intact  Back: no CVAT b/l  Skin: No rashes, no nevi    LABS:                        14.2   6.74  )-----------( 286      ( 08 Apr 2019 06:50 )             46.9     Na(135)/K(4.3)/Cl(103)/HCO3(21)/BUN(16)/Cr(1.38)Glu(93)/Ca(9.1)/Mg(--)/PO4(--)    04-08 @ 06:50  Na(142)/K(4.1)/Cl(106)/HCO3(20)/BUN(12)/Cr(1.29)Glu(82)/Ca(9.7)/Mg(2.2)/PO4(4.0)    04-07 @ 06:40  Na(143)/K(4.8)/Cl(108)/HCO3(19)/BUN(14)/Cr(1.36)Glu(110)/Ca(9.4)/Mg(--)/PO4(--)    04-06 @ 09:00    CPK 3745 (4/8) <== 2679 (4/7)  IMAGING:  < from: CT Head No Cont (04.06.19 @ 09:45) >  Unremarkable noncontrast head CT.    ASSESSMENT:  (1)Rhabdomyolysis - due to seizure/fall - CPK trending up - needs high-rate isotonic fluid to drive down the CPK  (2)HTN - mild/acceptable for now, on high-dose Norvasc    RECOMMEND:  (1)Agree with NS 150cc/h for now  (2)Norvasc as ordered  (3)CPK q12h; if CPK is >4000 on next check, would then increase the IVF to 200cc/h  (4)BMP daily    Thank you for involving [facility] in this patient's care.    With warm regards,    Jonah Powell MD   [facility]  [phone]

## 2019-04-08 NOTE — PROGRESS NOTE ADULT - SUBJECTIVE AND OBJECTIVE BOX
Patient is a 28y old  Male who presents with a chief complaint of Unwitnessed fall at home (07 Apr 2019 12:19)      SUBJECTIVE / OVERNIGHT EVENTS:    MEDICATIONS  (STANDING):  amLODIPine   Tablet 10 milliGRAM(s) Oral daily  dextrose 5%. 1000 milliLiter(s) (120 mL/Hr) IV Continuous <Continuous>  diazepam    Tablet 0.5 milliGRAM(s) Oral once    MEDICATIONS  (PRN):      Vital Signs Last 24 Hrs  T(C): 36.5 (08 Apr 2019 06:31), Max: 37.2 (07 Apr 2019 14:05)  T(F): 97.7 (08 Apr 2019 06:31), Max: 98.9 (07 Apr 2019 14:05)  HR: 86 (08 Apr 2019 06:31) (62 - 89)  BP: 147/86 (08 Apr 2019 06:31) (120/82 - 159/103)  BP(mean): --  RR: 20 (08 Apr 2019 06:31) (20 - 20)  SpO2: 100% (08 Apr 2019 06:31) (95% - 100%)      PHYSICAL EXAM:  GENERAL: NAD, well-developed  HEAD:  Atraumatic, Normocephalic  EYES: EOMI, PERRLA, conjunctiva and sclera clear  NECK: Supple, No JVD  CHEST/LUNG: Clear to auscultation bilaterally; No wheeze  HEART: Regular rate and rhythm; No murmurs, rubs, or gallops  ABDOMEN: Soft, Nontender, Nondistended; Bowel sounds present  EXTREMITIES:  2+ Peripheral Pulses, No clubbing, cyanosis, or edema  PSYCH: AAOx3  NEUROLOGY: non-focal  SKIN: No rashes or lesions    LABS:                        14.2   6.74  )-----------( 286      ( 08 Apr 2019 06:50 )             46.9     04-08    135  |  103  |  16  ----------------------------<  93  4.3   |  21<L>  |  1.38<H>    Ca    9.1      08 Apr 2019 06:50  Phos  4.0     04-07  Mg     2.2     04-07        CARDIAC MARKERS ( 08 Apr 2019 06:50 )  x     / x     / 3745 u/L / x     / x      CARDIAC MARKERS ( 07 Apr 2019 06:40 )  x     / x     / 2679 u/L / 4.83 ng/mL / x              RADIOLOGY & ADDITIONAL TESTS:    Imaging Personally Reviewed:    Consultant(s) Notes Reviewed:      Care Discussed with Consultants/Other Providers: Patient is a 28y old  Male who presents with a chief complaint of Unwitnessed fall at home (07 Apr 2019 12:19)      SUBJECTIVE / OVERNIGHT EVENTS:  Patient seen with parients at bedside    MEDICATIONS  (STANDING):  amLODIPine   Tablet 10 milliGRAM(s) Oral daily  dextrose 5%. 1000 milliLiter(s) (120 mL/Hr) IV Continuous <Continuous>  diazepam    Tablet 0.5 milliGRAM(s) Oral once    MEDICATIONS  (PRN):      Vital Signs Last 24 Hrs  T(C): 36.5 (08 Apr 2019 06:31), Max: 37.2 (07 Apr 2019 14:05)  T(F): 97.7 (08 Apr 2019 06:31), Max: 98.9 (07 Apr 2019 14:05)  HR: 86 (08 Apr 2019 06:31) (62 - 89)  BP: 147/86 (08 Apr 2019 06:31) (120/82 - 159/103)  BP(mean): --  RR: 20 (08 Apr 2019 06:31) (20 - 20)  SpO2: 100% (08 Apr 2019 06:31) (95% - 100%)      PHYSICAL EXAM:  GENERAL: NAD, well-developed  HEAD:  Atraumatic, Normocephalic  EYES: EOMI, PERRLA, conjunctiva and sclera clear  NECK: Supple, No JVD  CHEST/LUNG: Clear to auscultation bilaterally; No wheeze  HEART: Regular rate and rhythm; No murmurs, rubs, or gallops  ABDOMEN: Soft, Nontender, Nondistended; Bowel sounds present  EXTREMITIES:  2+ Peripheral Pulses, No clubbing, cyanosis, or edema  PSYCH: Alert  NEUROLOGY: non-focal  SKIN: No rashes or lesions    LABS:                        14.2   6.74  )-----------( 286      ( 08 Apr 2019 06:50 )             46.9     04-08    135  |  103  |  16  ----------------------------<  93  4.3   |  21<L>  |  1.38<H>    Ca    9.1      08 Apr 2019 06:50  Phos  4.0     04-07  Mg     2.2     04-07        CARDIAC MARKERS ( 08 Apr 2019 06:50 )  x     / x     / 3745 u/L / x     / x      CARDIAC MARKERS ( 07 Apr 2019 06:40 )  x     / x     / 2679 u/L / 4.83 ng/mL / x              RADIOLOGY & ADDITIONAL TESTS:    Imaging Personally Reviewed:    Consultant(s) Notes Reviewed:      Care Discussed with Consultants/Other Providers:

## 2019-04-08 NOTE — PROGRESS NOTE ADULT - ASSESSMENT
Mr. Grijalva is a 27 yo man with a hx of autism and HTN brought in by EMS after unwitnessed fall at home with acute encephalopathy, highly suspicious for seizure activity, found to be febrile, meeting SIRS criteria in addition to hypertensive urgency vs. hypertensive emergency on presentation. Pt is back to baseline with negative infectious w/u thus far.  Cpk 3745- Rhabdo- d/w Renal change IVF to NS- Renal will also consult Mr. Grijalva is a 29 yo man with a hx of autism and HTN brought in by EMS after unwitnessed fall at home with acute encephalopathy, highly suspicious for seizure activity, found to be febrile, meeting SIRS criteria in addition to hypertensive urgency vs. hypertensive emergency on presentation. Pt is back to baseline with negative infectious w/u thus far.  Cpk 3745- Rhabdo- d/w Renal change IVF to NS- Renal will also consult  RVP/ urine/ blood cult- negative

## 2019-04-08 NOTE — PROGRESS NOTE ADULT - PROBLEM SELECTOR PLAN 1
Now resolved. Patient at baseline mentation. History and exam most c/w seizure activity though. Unclear etiology for precipitating factor. Given elevated BP, seizure may have been manifestation of hypertensive emergency in the setting of discontinued antihypertensive meds. Possible occult infection given fever, but pt with normal WBC, negative UA, CXR and RVP and has no localizing symptoms. No electrolyte abnormalities. CT head negative for acute mass, infarct or hemorrhage.   - obtain EEG   - monitor closely for seizure activity while inpatient  - treat HTN as described below Now resolved. Patient at baseline mentation. History and exam most c/w seizure activity though. Unclear etiology for precipitating factor. Given elevated BP, seizure may have been manifestation of hypertensive emergency in the setting of discontinued antihypertensive meds. Possible occult infection given fever, but pt with normal WBC, negative UA, CXR and RVP and has no localizing symptoms. No electrolyte abnormalities. CT head negative for acute mass, infarct or hemorrhage.   - obtain EEG   Neurology  Recommendations:  [] Follow-up rEEG results  [] MRI Brain w/ and w/o contrast (Temporal Lobe Epilepsy Protocol)

## 2019-04-09 ENCOUNTER — TRANSCRIPTION ENCOUNTER (OUTPATIENT)
Age: 29
End: 2019-04-09

## 2019-04-09 LAB
ANION GAP SERPL CALC-SCNC: 9 MMO/L — SIGNIFICANT CHANGE UP (ref 7–14)
BUN SERPL-MCNC: 11 MG/DL — SIGNIFICANT CHANGE UP (ref 7–23)
CALCIUM SERPL-MCNC: 9 MG/DL — SIGNIFICANT CHANGE UP (ref 8.4–10.5)
CHLORIDE SERPL-SCNC: 109 MMOL/L — HIGH (ref 98–107)
CK SERPL-CCNC: 5648 U/L — HIGH (ref 30–200)
CO2 SERPL-SCNC: 25 MMOL/L — SIGNIFICANT CHANGE UP (ref 22–31)
CREAT SERPL-MCNC: 1.19 MG/DL — SIGNIFICANT CHANGE UP (ref 0.5–1.3)
GLUCOSE SERPL-MCNC: 88 MG/DL — SIGNIFICANT CHANGE UP (ref 70–99)
HCT VFR BLD CALC: 42 % — SIGNIFICANT CHANGE UP (ref 39–50)
HGB BLD-MCNC: 12.6 G/DL — LOW (ref 13–17)
MAGNESIUM SERPL-MCNC: 1.9 MG/DL — SIGNIFICANT CHANGE UP (ref 1.6–2.6)
MCHC RBC-ENTMCNC: 22.9 PG — LOW (ref 27–34)
MCHC RBC-ENTMCNC: 30 % — LOW (ref 32–36)
MCV RBC AUTO: 76.4 FL — LOW (ref 80–100)
NRBC # FLD: 0 K/UL — SIGNIFICANT CHANGE UP (ref 0–0)
PHOSPHATE SERPL-MCNC: 3 MG/DL — SIGNIFICANT CHANGE UP (ref 2.5–4.5)
PLATELET # BLD AUTO: 263 K/UL — SIGNIFICANT CHANGE UP (ref 150–400)
PMV BLD: SIGNIFICANT CHANGE UP FL (ref 7–13)
POTASSIUM SERPL-MCNC: 4.4 MMOL/L — SIGNIFICANT CHANGE UP (ref 3.5–5.3)
POTASSIUM SERPL-SCNC: 4.4 MMOL/L — SIGNIFICANT CHANGE UP (ref 3.5–5.3)
RBC # BLD: 5.5 M/UL — SIGNIFICANT CHANGE UP (ref 4.2–5.8)
RBC # FLD: 17.1 % — HIGH (ref 10.3–14.5)
SODIUM SERPL-SCNC: 143 MMOL/L — SIGNIFICANT CHANGE UP (ref 135–145)
WBC # BLD: 5.89 K/UL — SIGNIFICANT CHANGE UP (ref 3.8–10.5)
WBC # FLD AUTO: 5.89 K/UL — SIGNIFICANT CHANGE UP (ref 3.8–10.5)

## 2019-04-09 PROCEDURE — 70553 MRI BRAIN STEM W/O & W/DYE: CPT | Mod: 26

## 2019-04-09 PROCEDURE — 95819 EEG AWAKE AND ASLEEP: CPT | Mod: 26

## 2019-04-09 PROCEDURE — 99233 SBSQ HOSP IP/OBS HIGH 50: CPT

## 2019-04-09 RX ORDER — HEPARIN SODIUM 5000 [USP'U]/ML
5000 INJECTION INTRAVENOUS; SUBCUTANEOUS EVERY 8 HOURS
Qty: 0 | Refills: 0 | Status: DISCONTINUED | OUTPATIENT
Start: 2019-04-09 | End: 2019-04-14

## 2019-04-09 RX ADMIN — SODIUM CHLORIDE 200 MILLILITER(S): 9 INJECTION INTRAMUSCULAR; INTRAVENOUS; SUBCUTANEOUS at 07:21

## 2019-04-09 RX ADMIN — HEPARIN SODIUM 5000 UNIT(S): 5000 INJECTION INTRAVENOUS; SUBCUTANEOUS at 21:15

## 2019-04-09 RX ADMIN — SODIUM CHLORIDE 200 MILLILITER(S): 9 INJECTION INTRAMUSCULAR; INTRAVENOUS; SUBCUTANEOUS at 21:10

## 2019-04-09 RX ADMIN — AMLODIPINE BESYLATE 10 MILLIGRAM(S): 2.5 TABLET ORAL at 07:21

## 2019-04-09 RX ADMIN — Medication 2 MILLIGRAM(S): at 07:21

## 2019-04-09 RX ADMIN — SODIUM CHLORIDE 200 MILLILITER(S): 9 INJECTION INTRAMUSCULAR; INTRAVENOUS; SUBCUTANEOUS at 12:02

## 2019-04-09 NOTE — PROGRESS NOTE ADULT - SUBJECTIVE AND OBJECTIVE BOX
Patient is a 28y old  Male who presents with a chief complaint of Unwitnessed fall at home (08 Apr 2019 10:47)      SUBJECTIVE / OVERNIGHT EVENTS:    MEDICATIONS  (STANDING):  amLODIPine   Tablet 10 milliGRAM(s) Oral daily  sodium chloride 0.9%. 1000 milliLiter(s) (200 mL/Hr) IV Continuous <Continuous>      PHYSICAL EXAM:  GENERAL: NAD, well-developed  HEAD:  Atraumatic, Normocephalic  EYES: EOMI, PERRLA, conjunctiva and sclera clear  NECK: Supple, No JVD  CHEST/LUNG: Clear to auscultation bilaterally; No wheeze  HEART: Regular rate and rhythm; No murmurs, rubs, or gallops  ABDOMEN: Soft, Nontender, Nondistended; Bowel sounds present  EXTREMITIES:  2+ Peripheral Pulses, No clubbing, cyanosis, or edema  PSYCH: AAOx3  NEUROLOGY: non-focal  SKIN: No rashes or lesions    LABS:                        12.6   5.89  )-----------( 263      ( 09 Apr 2019 06:40 )             42.0     04-09    143  |  109<H>  |  11  ----------------------------<  88  4.4   |  25  |  1.19    Ca    9.0      09 Apr 2019 06:40  Phos  3.0     04-09  Mg     1.9     04-09        CARDIAC MARKERS ( 09 Apr 2019 06:40 )  x     / x     / 5648 u/L / x     / x      CARDIAC MARKERS ( 08 Apr 2019 16:00 )  x     / x     / 6486 u/L / x     / x      CARDIAC MARKERS ( 08 Apr 2019 06:50 )  x     / x     / 3745 u/L / x     / x              RADIOLOGY & ADDITIONAL TESTS:    Imaging Personally Reviewed:    Consultant(s) Notes Reviewed:      Care Discussed with Consultants/Other Providers: Patient is a 28y old  Male who presents with a chief complaint of Unwitnessed fall at home (08 Apr 2019 10:47)      SUBJECTIVE / OVERNIGHT EVENTS:  Patient seen with parents at bedside.  Explained in detail his Rhabdo is still ungoing and still needs IVF Hydration.  Also Neurology wants Video EEG    MEDICATIONS  (STANDING):  amLODIPine   Tablet 10 milliGRAM(s) Oral daily  sodium chloride 0.9%. 1000 milliLiter(s) (200 mL/Hr) IV Continuous <Continuous>    Vital Signs Last 24 Hrs  T(C): 36.6 (09 Apr 2019 06:55), Max: 37.2 (08 Apr 2019 14:49)  T(F): 97.9 (09 Apr 2019 06:55), Max: 99 (08 Apr 2019 14:49)  HR: 88 (09 Apr 2019 07:21) (52 - 88)  BP: 128/86 (09 Apr 2019 06:55) (128/86 - 132/93)  BP(mean): --  RR: 18 (09 Apr 2019 06:55) (18 - 20)  SpO2: 98% (09 Apr 2019 06:55) (98% - 100%)  PHYSICAL EXAM:  OBESE  GENERAL: NAD, well-developed  HEAD:  Atraumatic, Normocephalic  EYES: EOMI, PERRLA, conjunctiva and sclera clear  NECK: Supple, No JVD  CHEST/LUNG: Clear to auscultation bilaterally; No wheeze  HEART: Regular rate and rhythm; No murmurs, rubs, or gallops  ABDOMEN: Soft, Nontender, Nondistended; Bowel sounds present  EXTREMITIES:  2+ Peripheral Pulses, No clubbing, cyanosis, or edema  PSYCH: Alert  NEUROLOGY: non-focal  SKIN: No rashes or lesions    LABS:                        12.6   5.89  )-----------( 263      ( 09 Apr 2019 06:40 )             42.0     04-09    143  |  109<H>  |  11  ----------------------------<  88  4.4   |  25  |  1.19    Ca    9.0      09 Apr 2019 06:40  Phos  3.0     04-09  Mg     1.9     04-09        CARDIAC MARKERS ( 09 Apr 2019 06:40 )  x     / x     / 5648 u/L / x     / x      CARDIAC MARKERS ( 08 Apr 2019 16:00 )  x     / x     / 6486 u/L / x     / x      CARDIAC MARKERS ( 08 Apr 2019 06:50 )  x     / x     / 3745 u/L / x     / x              RADIOLOGY & ADDITIONAL TESTS:    Imaging Personally Reviewed:    Consultant(s) Notes Reviewed:      Care Discussed with Consultants/Other Providers:

## 2019-04-09 NOTE — PROGRESS NOTE ADULT - ASSESSMENT
Mr. Grijalva is a 27 yo man with a hx of autism and HTN brought in by EMS after unwitnessed fall at home with acute encephalopathy, highly suspicious for seizure activity, found to be febrile, meeting SIRS criteria in addition to hypertensive urgency vs. hypertensive emergency on presentation. Pt is back to baseline with negative infectious w/u thus far.  Cpk 5648 after peaking at >6000- Rhabdo- d/w Renal change IVF to NS- Renal appreciated  RVP/ urine/ blood cult- negative

## 2019-04-09 NOTE — DISCHARGE NOTE PROVIDER - HOSPITAL COURSE
27 yo man with a hx of autism and HTN brought in by EMS after unwitnessed fall at home with acute encephalopathy, highly suspicious for seizure activity, found to be febrile, meeting SIRS criteria in addition to hypertensive urgency vs. hypertensive emergency on presentation. Pt is back to baseline with negative infectious w/u thus far.        Acute encephalopathy     - Now resolved    - Patient at baseline mentation    - History and exam most c/w seizure activity though. Unclear etiology for precipitating factor    - Given elevated BP, seizure may have been manifestation of hypertensive emergency in the setting of discontinued antihypertensive meds    - Possible occult infection given fever, but pt with normal WBC, negative UA, CXR and RVP and has no localizing symptoms    - No electrolyte abnormalities    - CT head negative for acute mass, infarct or hemorrhage    - EEG- Negative for Sz activity     - Neuro consulted     - MRI Brain: No intracranial abnormality is identified.    - BCx--NTD    - UCx---NTD        rhabdo     - monitor    - IVF        SIRS     - Patient met criteria on presentation. Likely due to acute seizure.     - However, will need to r/o sepsis    - Pt s/p dose of Zosyn in the ED    - blood cultures- NTD     - urine cx-NTD    - RVP-Negative         Hypertensive urgency    - Elevated BP likely due to recent discontinuation of BP meds: amlodipine/HCTZ    -  amlodipine 10mg daily for now.    - If does not adequately control BP, will also restart HCTZ    - monitor BP and mental status.         Metabolic acidosis    - Noted increase in lactate, which is likely byproduct of seizure activity    - However, cannot exclude infectious source.     - Lactate now WNL after IV fluids.    - monitor bicarb level        Creatinine elevation    - Baseline Cr unknown    - Cannot confirm JUSTIN vs. underlying CKD at this time    - renally dose meds as needed    - renal Dr Powell     - crea back to normal after IVF hydration 27 yo man with a hx of autism and HTN brought in by EMS after unwitnessed fall at home with acute encephalopathy, highly suspicious for seizure activity, found to be febrile, meeting SIRS criteria in addition to hypertensive urgency vs. hypertensive emergency on presentation. Pt is back to baseline with negative infectious workup.        Acute encephalopathy     - Now resolved    - Patient at baseline mentation    - History and exam most c/w seizure activity though. Unclear etiology for precipitating factor    - Given elevated BP, seizure may have been manifestation of hypertensive emergency in the setting of discontinued antihypertensive meds    - Possible occult infection given fever, but pt with normal WBC, negative UA, CXR and RVP and has no localizing symptoms    - No electrolyte abnormalities    - CT head negative for acute mass, infarct or hemorrhage    - EEG- Negative for Sz activity     - Neuro consulted --> recommended Depakote    - MRI Brain: No intracranial abnormality is identified.    - BCx: negative    - UCx: negative         Rhabdo     - IVF    - CK down trending        SIRS     - Patient met criteria on presentation. Likely due to acute seizure.     - However, will need to r/o sepsis    - Pt s/p dose of Zosyn in the ED    - blood cultures- Negative    - urine cx-Negatove     - RVP-Negative         Hypertensive urgency    - Elevated BP likely due to recent discontinuation of BP meds: amlodipine/HCTZ    -  amlodipine 10mg daily for now.        Metabolic acidosis    - Noted increase in lactate, which is likely byproduct of seizure activity    - However, cannot exclude infectious source.     - Lactate now WNL after IV fluids.    - monitor bicarb level        Creatinine elevation    - Baseline Cr unknown    - Cannot confirm JUSTIN vs. underlying CKD at this time    - renally dose meds as needed    - renal Dr Powell     - elidaa back to normal after IVF hydration         Dispo: Home. Father requesting to proceed with discharge even if home care aides are unable to be re-instated until tomorrow.

## 2019-04-09 NOTE — EEG REPORT - NS EEG TEXT BOX
· EEG Report		  [facility]   REPORT OF ROUTINE VIDEO EEG     Study Date: 04-09-19    _____________________________________________________________  TECHNICAL INFORMATION    Placement and Labeling of Electrodes:  The EEG was performed utilizing 20 channels referential EEG connections (coronal over temporal over parasagittal montage) using all standard 10-20 electrode placements with EKG.  Recording was at a sampling rate of 256 samples per second per channel.  Time synchronized digital video recording was done simultaneously with EEG recording.  A low light infrared camera was used for low light recording.  Austyn and seizure detection algorithms were utilized.      _____________________________________________________________  STUDY INTERPRETATION    Findings: The background was continuous, spontaneously variable and reactive. Posterior dominant rhythm was a symmetrical, sustained 9hz activity at 30uv that attenuated to eye opening. Low amplitude frontal beta was present. Background consisted of alpha/beta frequencies,     Background slowing:  None    Sleep Background:  No drowsiness or sleep II transients recorded.     Other Non-Epileptiform Findings:  None    Interictal Epileptiform Activity:   None were present.    Events:  Clinical events: None recorded.  Seizures: None recorded.    Activation Procedures:   Hyperventilation was performed and normal.   Photic stimulation was performed and normal.   Artifacts:  Intermittent myogenic and movement artifacts were noted.    ECG:  The heart rate on single channel ECG was predominantly between 70-90 BPM.    _____________________________________________________________  EEG SUMMARY/CLASSIFICATION    Normal EEG in an awake patient.     _____________________________________________________________  EEG IMPRESSION/CLINICAL CORRELATE      1.Normal EEG  2. No epileptiform pattern or seizure seen.      Dana Kang MD  Clinical Neurophysiology Fellow · EEG Report		  [facility]   REPORT OF ROUTINE VIDEO EEG     Study Date: 04-09-19    _____________________________________________________________  TECHNICAL INFORMATION    Placement and Labeling of Electrodes:  The EEG was performed utilizing 20 channels referential EEG connections (coronal over temporal over parasagittal montage) using all standard 10-20 electrode placements with EKG.  Recording was at a sampling rate of 256 samples per second per channel.  Time synchronized digital video recording was done simultaneously with EEG recording.  A low light infrared camera was used for low light recording.  Austyn and seizure detection algorithms were utilized.      _____________________________________________________________  STUDY INTERPRETATION    Findings: The background was continuous, spontaneously variable and reactive. Posterior dominant rhythm was a symmetrical, sustained 9hz activity at 30uv that attenuated to eye opening. Low amplitude frontal beta was present. Background consisted of alpha/beta frequencies,     Background slowing:  None    Sleep Background:  No drowsiness or sleep II transients recorded.     Other Non-Epileptiform Findings:  None    Interictal Epileptiform Activity:   -Sharp Wave, Regional, Right Parieto-occipital, maximum P4/P8/O2 (seen once)    Events:  Clinical events: None recorded.  Seizures: None recorded.    Activation Procedures:   Hyperventilation was performed and normal.   Photic stimulation was performed and normal.     Artifacts:  Intermittent myogenic and movement artifacts were noted.    ECG:  The heart rate on single channel ECG was predominantly between 70-90 BPM.    _____________________________________________________________  EEG SUMMARY/CLASSIFICATION  Abnormal EEG in an awake patient.   -Sharp Wave, Regional, Right Parieto-occipital, maximum P4/P8/O2 (seen once)  _____________________________________________________________  EEG IMPRESSION/CLINICAL CORRELATE  1. Potential seizure focus in the right parieto-occipital region.  2. No epileptiform pattern or seizure seen.    Dana Kang MD  Clinical Neurophysiology Fellow    Gavino Carroll MD  EEG / Epilepsy Attending Physician

## 2019-04-09 NOTE — DISCHARGE NOTE PROVIDER - NSDCCPCAREPLAN_GEN_ALL_CORE_FT
PRINCIPAL DISCHARGE DIAGNOSIS  Diagnosis: Encephalopathy acute  Assessment and Plan of Treatment:       SECONDARY DISCHARGE DIAGNOSES  Diagnosis: Creatinine elevation  Assessment and Plan of Treatment: You were seen by a nephrologist.   Resolved after IV hydration.    Diagnosis: Metabolic acidosis  Assessment and Plan of Treatment: Resolved after hydration.    Diagnosis: Hypertensive urgency  Assessment and Plan of Treatment: Likely related to discontinuing amlodipine and hydrochlorothiazide.   Amlodipine was started while in hospital and blood pressure is stable.    Diagnosis: SIRS (systemic inflammatory response syndrome)  Assessment and Plan of Treatment: Infectious work up is negative.    Diagnosis: Non-traumatic rhabdomyolysis  Assessment and Plan of Treatment: Creatinine kinase trending down after hydration.    Diagnosis: Fall  Assessment and Plan of Treatment: PRINCIPAL DISCHARGE DIAGNOSIS  Diagnosis: Encephalopathy acute  Assessment and Plan of Treatment: Resolved      SECONDARY DISCHARGE DIAGNOSES  Diagnosis: Seizure  Assessment and Plan of Treatment: Continue with anti-epileptic medication as directed. Follow up with neurologist within 2 weeks of discharge.    Diagnosis: Non-traumatic rhabdomyolysis  Assessment and Plan of Treatment: Creatinine kinase trending down after hydration. Follow up with PCP within 1 week of discharge.    Diagnosis: Creatinine elevation  Assessment and Plan of Treatment: You were seen by a nephrologist.   Resolved after IV hydration. Follow up with PCP within 1 week of discharge for routine monitoring of kidney function.    Diagnosis: Hypertensive urgency  Assessment and Plan of Treatment: Likely related to discontinuing amlodipine and hydrochlorothiazide.   Amlodipine was started while in hospital and blood pressure is stable.  Continue with Amlodipine. Follow up with PCP within 1 week of discharge to determine if hydrochlorothiazide should be resumed. Monitor for any visual changes, headaches or dizziness.  Monitor blood pressure regularly.  Follow up with your PCP for further management for high blood pressure, please call to make appointment within 1 week of discharge    Diagnosis: SIRS (systemic inflammatory response syndrome)  Assessment and Plan of Treatment: Infectious work up is negative.    Diagnosis: Fall  Assessment and Plan of Treatment: Maintain a safe enviroment. Do not change positions quickly.

## 2019-04-09 NOTE — PROGRESS NOTE ADULT - PROBLEM SELECTOR PLAN 1
Now resolved. Patient at baseline mentation. History and exam most c/w seizure activity though. Unclear etiology for precipitating factor. Given elevated BP, seizure may have been manifestation of hypertensive emergency in the setting of discontinued antihypertensive meds. Possible occult infection given fever, but pt with normal WBC, negative UA, CXR and RVP and has no localizing symptoms. No electrolyte abnormalities. CT head negative for acute mass, infarct or hemorrhage.   - obtain EEG   Neurology  Recommendations:  EEG results- neg.  await video EEG  MRI Brain w/ and w/o contrast (Temporal Lobe Epilepsy Protocol)- no lesions

## 2019-04-09 NOTE — PROGRESS NOTE ADULT - SUBJECTIVE AND OBJECTIVE BOX
Patient seen and examined in bed. No new complaints.       MEDICATIONS  (STANDING):  amLODIPine   Tablet 10 milliGRAM(s) Oral daily  sodium chloride 0.9%. 1000 milliLiter(s) (200 mL/Hr) IV Continuous <Continuous>      VITAL:  T(C): , Max: 36.6 (04-09-19 @ 06:55)  T(F): , Max: 97.9 (04-09-19 @ 06:55)  HR: 88 (04-09-19 @ 07:21)  BP: 128/86 (04-09-19 @ 06:55)  RR: 18 (04-09-19 @ 06:55)  SpO2: 98% (04-09-19 @ 06:55)          PHYSICAL EXAM:    Constitutional: NAD  Neck:  No JVD  Respiratory: CTAB/L  Cardiovascular: S1 and S2  Gastrointestinal: BS+, soft, NT/ND  Extremities: No peripheral edema  : No Brandon  Skin: No rashes    LABS:                        12.6   5.89  )-----------( 263      ( 09 Apr 2019 06:40 )             42.0     04-09    143  |  109<H>  |  11  ----------------------------<  88  4.4   |  25  |  1.19    Ca    9.0      09 Apr 2019 06:40  Phos  3.0     04-09  Mg     1.9     04-09      ASSESSMENT:  28 year-old man with history of autism, HTN, and distant history of seizures, brought in 4/6/19 to the [facility] ER after seizure/unwitnessed fall. He was noted to have elevated CPK.    (1)Rhabdomyolysis - due to seizure/fall - CPK now trending down - needs high-rate isotonic fluid to drive down the CPK  (2)HTN - BP stable    RECOMMEND:  (1)Continue NS @ 200cc/hr  (2)Norvasc as ordered  (3)BMP daily      Shanika Mcguire NPKamiC  [facility]  [phone] Patient seen and examined in bed. Nonverbal.      MEDICATIONS  (STANDING):  amLODIPine   Tablet 10 milliGRAM(s) Oral daily  sodium chloride 0.9%. 1000 milliLiter(s) (200 mL/Hr) IV Continuous <Continuous>      VITAL:  T(C): , Max: 36.6 (04-09-19 @ 06:55)  T(F): , Max: 97.9 (04-09-19 @ 06:55)  HR: 88 (04-09-19 @ 07:21)  BP: 128/86 (04-09-19 @ 06:55)  RR: 18 (04-09-19 @ 06:55)  SpO2: 98% (04-09-19 @ 06:55)          PHYSICAL EXAM:    Constitutional: NAD  Neck:  No JVD  Respiratory: CTAB/L  Cardiovascular: S1 and S2  Gastrointestinal: BS+, soft, NT/ND  Extremities: No peripheral edema  : No Brandon  Skin: No rashes    LABS:                        12.6   5.89  )-----------( 263      ( 09 Apr 2019 06:40 )             42.0     04-09    143  |  109<H>  |  11  ----------------------------<  88  4.4   |  25  |  1.19    Ca    9.0      09 Apr 2019 06:40  Phos  3.0     04-09  Mg     1.9     04-09      ASSESSMENT:  28 year-old man with history of autism, HTN, and distant history of seizures, brought in 4/6/19 to the [facility] ER after seizure/unwitnessed fall. He was noted to have elevated CPK.    (1)Rhabdomyolysis - due to seizure/fall - CPK now trending down - needs high-rate isotonic fluid to drive down the CPK  (2)HTN - BP improving    RECOMMEND:  (1)Continue NS @ 200cc/hr  (2)Norvasc as ordered  (3)BMP daily      ALIVIA DudleyC  [facility]  [phone]

## 2019-04-10 LAB
ANION GAP SERPL CALC-SCNC: 12 MMO/L — SIGNIFICANT CHANGE UP (ref 7–14)
ANION GAP SERPL CALC-SCNC: 12 MMO/L — SIGNIFICANT CHANGE UP (ref 7–14)
BUN SERPL-MCNC: 10 MG/DL — SIGNIFICANT CHANGE UP (ref 7–23)
BUN SERPL-MCNC: 10 MG/DL — SIGNIFICANT CHANGE UP (ref 7–23)
CALCIUM SERPL-MCNC: 8.9 MG/DL — SIGNIFICANT CHANGE UP (ref 8.4–10.5)
CALCIUM SERPL-MCNC: 8.9 MG/DL — SIGNIFICANT CHANGE UP (ref 8.4–10.5)
CHLORIDE SERPL-SCNC: 109 MMOL/L — HIGH (ref 98–107)
CHLORIDE SERPL-SCNC: 109 MMOL/L — HIGH (ref 98–107)
CK SERPL-CCNC: 6035 U/L — HIGH (ref 30–200)
CO2 SERPL-SCNC: 24 MMOL/L — SIGNIFICANT CHANGE UP (ref 22–31)
CO2 SERPL-SCNC: 24 MMOL/L — SIGNIFICANT CHANGE UP (ref 22–31)
CREAT SERPL-MCNC: 1.13 MG/DL — SIGNIFICANT CHANGE UP (ref 0.5–1.3)
CREAT SERPL-MCNC: 1.13 MG/DL — SIGNIFICANT CHANGE UP (ref 0.5–1.3)
GLUCOSE SERPL-MCNC: 82 MG/DL — SIGNIFICANT CHANGE UP (ref 70–99)
GLUCOSE SERPL-MCNC: 82 MG/DL — SIGNIFICANT CHANGE UP (ref 70–99)
HCT VFR BLD CALC: 42.9 % — SIGNIFICANT CHANGE UP (ref 39–50)
HGB BLD-MCNC: 12.8 G/DL — LOW (ref 13–17)
MAGNESIUM SERPL-MCNC: 1.9 MG/DL — SIGNIFICANT CHANGE UP (ref 1.6–2.6)
MCHC RBC-ENTMCNC: 23.1 PG — LOW (ref 27–34)
MCHC RBC-ENTMCNC: 29.8 % — LOW (ref 32–36)
MCV RBC AUTO: 77.6 FL — LOW (ref 80–100)
NRBC # FLD: 0 K/UL — SIGNIFICANT CHANGE UP (ref 0–0)
PHOSPHATE SERPL-MCNC: 3.1 MG/DL — SIGNIFICANT CHANGE UP (ref 2.5–4.5)
PLATELET # BLD AUTO: 275 K/UL — SIGNIFICANT CHANGE UP (ref 150–400)
PMV BLD: 12.7 FL — SIGNIFICANT CHANGE UP (ref 7–13)
POTASSIUM SERPL-MCNC: 4.2 MMOL/L — SIGNIFICANT CHANGE UP (ref 3.5–5.3)
POTASSIUM SERPL-MCNC: 4.2 MMOL/L — SIGNIFICANT CHANGE UP (ref 3.5–5.3)
POTASSIUM SERPL-SCNC: 4.2 MMOL/L — SIGNIFICANT CHANGE UP (ref 3.5–5.3)
POTASSIUM SERPL-SCNC: 4.2 MMOL/L — SIGNIFICANT CHANGE UP (ref 3.5–5.3)
RBC # BLD: 5.53 M/UL — SIGNIFICANT CHANGE UP (ref 4.2–5.8)
RBC # FLD: 17.2 % — HIGH (ref 10.3–14.5)
SODIUM SERPL-SCNC: 145 MMOL/L — SIGNIFICANT CHANGE UP (ref 135–145)
SODIUM SERPL-SCNC: 145 MMOL/L — SIGNIFICANT CHANGE UP (ref 135–145)
WBC # BLD: 6.49 K/UL — SIGNIFICANT CHANGE UP (ref 3.8–10.5)
WBC # FLD AUTO: 6.49 K/UL — SIGNIFICANT CHANGE UP (ref 3.8–10.5)

## 2019-04-10 PROCEDURE — 99232 SBSQ HOSP IP/OBS MODERATE 35: CPT | Mod: GC

## 2019-04-10 PROCEDURE — 95951: CPT | Mod: 26

## 2019-04-10 PROCEDURE — 99233 SBSQ HOSP IP/OBS HIGH 50: CPT

## 2019-04-10 RX ORDER — SODIUM CHLORIDE 9 MG/ML
1000 INJECTION INTRAMUSCULAR; INTRAVENOUS; SUBCUTANEOUS
Qty: 0 | Refills: 0 | Status: DISCONTINUED | OUTPATIENT
Start: 2019-04-10 | End: 2019-04-11

## 2019-04-10 RX ORDER — DIVALPROEX SODIUM 500 MG/1
250 TABLET, DELAYED RELEASE ORAL
Qty: 0 | Refills: 0 | Status: DISCONTINUED | OUTPATIENT
Start: 2019-04-10 | End: 2019-04-14

## 2019-04-10 RX ADMIN — SODIUM CHLORIDE 200 MILLILITER(S): 9 INJECTION INTRAMUSCULAR; INTRAVENOUS; SUBCUTANEOUS at 08:10

## 2019-04-10 RX ADMIN — SODIUM CHLORIDE 200 MILLILITER(S): 9 INJECTION INTRAMUSCULAR; INTRAVENOUS; SUBCUTANEOUS at 05:27

## 2019-04-10 RX ADMIN — SODIUM CHLORIDE 250 MILLILITER(S): 9 INJECTION INTRAMUSCULAR; INTRAVENOUS; SUBCUTANEOUS at 21:45

## 2019-04-10 RX ADMIN — HEPARIN SODIUM 5000 UNIT(S): 5000 INJECTION INTRAVENOUS; SUBCUTANEOUS at 05:27

## 2019-04-10 RX ADMIN — SODIUM CHLORIDE 250 MILLILITER(S): 9 INJECTION INTRAMUSCULAR; INTRAVENOUS; SUBCUTANEOUS at 16:15

## 2019-04-10 RX ADMIN — HEPARIN SODIUM 5000 UNIT(S): 5000 INJECTION INTRAVENOUS; SUBCUTANEOUS at 21:45

## 2019-04-10 RX ADMIN — HEPARIN SODIUM 5000 UNIT(S): 5000 INJECTION INTRAVENOUS; SUBCUTANEOUS at 13:10

## 2019-04-10 RX ADMIN — DIVALPROEX SODIUM 250 MILLIGRAM(S): 500 TABLET, DELAYED RELEASE ORAL at 17:16

## 2019-04-10 RX ADMIN — AMLODIPINE BESYLATE 10 MILLIGRAM(S): 2.5 TABLET ORAL at 05:27

## 2019-04-10 NOTE — PROGRESS NOTE ADULT - SUBJECTIVE AND OBJECTIVE BOX
Patient is a 28y old  Male who presents with a chief complaint of Unwitnessed fall at home (10 Apr 2019 10:53)      SUBJECTIVE / OVERNIGHT EVENTS:    MEDICATIONS  (STANDING):  amLODIPine   Tablet 10 milliGRAM(s) Oral daily  diVALproex  milliGRAM(s) Oral two times a day  heparin  Injectable 5000 Unit(s) SubCutaneous every 8 hours  sodium chloride 0.9%. 1000 milliLiter(s) (200 mL/Hr) IV Continuous <Continuous>    MEDICATIONS  (PRN):        PHYSICAL EXAM:  GENERAL: NAD, well-developed  HEAD:  Atraumatic, Normocephalic  EYES: EOMI, PERRLA, conjunctiva and sclera clear  NECK: Supple, No JVD  CHEST/LUNG: Clear to auscultation bilaterally; No wheeze  HEART: Regular rate and rhythm; No murmurs, rubs, or gallops  ABDOMEN: Soft, Nontender, Nondistended; Bowel sounds present  EXTREMITIES:  2+ Peripheral Pulses, No clubbing, cyanosis, or edema  PSYCH: AAOx3  NEUROLOGY: non-focal  SKIN: No rashes or lesions    LABS:                        12.8   6.49  )-----------( 275      ( 10 Apr 2019 06:35 )             42.9     04-10    145  |  109<H>  |  10  ----------------------------<  82  4.2   |  24  |  1.13    Ca    8.9      10 Apr 2019 06:35  Phos  3.1     04-10  Mg     1.9     04-10        CARDIAC MARKERS ( 10 Apr 2019 06:35 )  x     / x     / 6035 u/L / x     / x      CARDIAC MARKERS ( 09 Apr 2019 06:40 )  x     / x     / 5648 u/L / x     / x      CARDIAC MARKERS ( 08 Apr 2019 16:00 )  x     / x     / 6486 u/L / x     / x              RADIOLOGY & ADDITIONAL TESTS:    Imaging Personally Reviewed:    Consultant(s) Notes Reviewed:      Care Discussed with Consultants/Other Providers:  Neurology-Video eeg- Seizure

## 2019-04-10 NOTE — PROGRESS NOTE ADULT - ASSESSMENT
27 y/o [place] male with past medical history of Autism and HTN admitted for unwitnessed fall w/ concern for seizure in the setting of SIRS and hypertensive urgency. Neurologic exam demonstrates anisocoria of L pupil w/ size of 5mm though both are reactive. Patient is non-verbal though able to communicate with gestures and writing. CT head unremarkable for acute pathology. In the ED, patient was noted to be febrile to 101.3F w/ lactate of 5.8 and tachycardic. MRI head w/ and w/o contrast motion limited, otherwise negative    Recommendations:  [] vEEG negative 29 y/o [place] male with past medical history of Autism and HTN admitted for unwitnessed fall w/ concern for seizure in the setting of SIRS and hypertensive urgency. Neurologic exam demonstrates anisocoria of L pupil w/ size of 5mm though both are reactive. Patient is non-verbal though able to communicate with gestures and writing. CT head unremarkable for acute pathology. In the ED, patient was noted to be febrile to 101.3F w/ lactate of 5.8 and tachycardic. MRI head w/ and w/o contrast motion limited, otherwise negative    Recommendations:    [] Routine EEG revealed  sharp wave in the right parietal occipital region and so will start depakote 250mg BID and assess response  [] Complete continuous EEG monitoring today  [] Seizure precautions and continue medical mgt and supportive care

## 2019-04-10 NOTE — PROGRESS NOTE ADULT - ASSESSMENT
Mr. Grijalva is a 29 yo man with a hx of autism and HTN brought in by EMS after unwitnessed fall at home with acute encephalopathy, highly suspicious for seizure activity, found to be febrile, meeting SIRS criteria in addition to hypertensive urgency vs. hypertensive emergency on presentation. Pt is back to baseline with negative infectious w/u thus far.  Cpk 6035 d/w Renal change IVF to NS- Renal appreciated  RVP/ urine/ blood cult- negative

## 2019-04-10 NOTE — PROGRESS NOTE ADULT - PROBLEM SELECTOR PLAN 1
Now resolved. Patient at baseline mentation. History and exam most c/w seizure activity though. Unclear etiology for precipitating factor. Given elevated BP, seizure may have been manifestation of hypertensive emergency in the setting of discontinued antihypertensive meds. Possible occult infection given fever, but pt with normal WBC, negative UA, CXR and RVP and has no localizing symptoms. No electrolyte abnormalities. CT head negative for acute mass, infarct or hemorrhage.   - obtain EEG   Neurology  Recommendations:  MRI Brain w/ and w/o contrast (Temporal Lobe Epilepsy Protocol)- no lesions  Video EEG- notes Seizure - start Now resolved. Patient at baseline mentation. History and exam most c/w seizure activity though. Unclear etiology for precipitating factor. Given elevated BP, seizure may have been manifestation of hypertensive emergency in the setting of discontinued antihypertensive meds. Possible occult infection given fever, but pt with normal WBC, negative UA, CXR and RVP and has no localizing symptoms. No electrolyte abnormalities. CT head negative for acute mass, infarct or hemorrhage.   - obtain EEG   Neurology  Recommendations:  MRI Brain w/ and w/o contrast (Temporal Lobe Epilepsy Protocol)- no lesions  Video EEG- notes Seizure - start Depakote

## 2019-04-10 NOTE — PROGRESS NOTE ADULT - SUBJECTIVE AND OBJECTIVE BOX
No pain, no shortness of breath      VITAL:  T(C): , Max: 36.8 (04-09-19 @ 15:06)  T(F): , Max: 98.2 (04-09-19 @ 15:06)  HR: 96 (04-10-19 @ 13:22)  BP: 153/79 (04-10-19 @ 13:22)  RR: 16 (04-10-19 @ 13:22)  SpO2: 100% (04-10-19 @ 13:22)      PHYSICAL EXAM:    Constitutional: NAD; Alert  HEENT:  NCAT; DMM  Neck: No JVD; supple  Respiratory: CTA-b/l  Cardiac: RRR s1s2  Gastrointestinal: BS+, soft, NT/ND  Urologic: No lu  Extremities: No peripheral edema  Back: No CVAT b/l    LABS:                        12.8   6.49  )-----------( 275      ( 10 Apr 2019 06:35 )             42.9     Na(145)/K(4.2)/Cl(109)/HCO3(24)/BUN(10)/Cr(1.13)Glu(82)/Ca(8.9)/Mg(1.9)/PO4(3.1)    04-10 @ 06:35  Na(143)/K(4.4)/Cl(109)/HCO3(25)/BUN(11)/Cr(1.19)Glu(88)/Ca(9.0)/Mg(1.9)/PO4(3.0)    04-09 @ 06:40  Na(140)/K(4.2)/Cl(104)/HCO3(26)/BUN(14)/Cr(1.36)Glu(110)/Ca(9.2)/Mg(--)/PO4(--)    04-08 @ 16:00  Na(135)/K(4.3)/Cl(103)/HCO3(21)/BUN(16)/Cr(1.38)Glu(93)/Ca(9.1)/Mg(--)/PO4(--)    04-08 @ 06:50    CPK 6035<==5648<==6486      IMPRESSION: 28M w/ HTN, autism, and seizures in distant past, 4/6/19 a/w fall/seziure, c/b rhadomyolysis    (1)Rhabdomyolysis - due to seizure/fall - on NS 200cc/h; CPK up a bit as of today despite the aggressive IVF - indicated that we increase the IVF. IF develops pulmonary edema, we can always add diuretics in addition to giving the IVF  (2)HTN - mild/acceptable for now, on high-dose Norvasc      RECOMMEND:  (1)Increase NS to 250cc/h  (2)If develops pulmonary edema, would add IV diuretics (no need for now)  (3)Norvasc as ordered  (4)CPK qd + BMP qd            Jonah Powell MD  [facility]  [phone]

## 2019-04-10 NOTE — PROGRESS NOTE ADULT - SUBJECTIVE AND OBJECTIVE BOX
Neurology Follow up note    Name  RONALD BELTRAN    Subjective: Feeling well    Review of Systems:  As above    MEDICATIONS  (STANDING):  amLODIPine   Tablet 10 milliGRAM(s) Oral daily  heparin  Injectable 5000 Unit(s) SubCutaneous every 8 hours  sodium chloride 0.9%. 1000 milliLiter(s) (200 mL/Hr) IV Continuous <Continuous>    MEDICATIONS  (PRN):      Allergies    No Known Allergies    Intolerances        Objective:   Vital Signs Last 24 Hrs  T(C): 36.5 (10 Apr 2019 05:26), Max: 36.8 (09 Apr 2019 15:06)  T(F): 97.7 (10 Apr 2019 05:26), Max: 98.2 (09 Apr 2019 15:06)  HR: 67 (10 Apr 2019 05:26) (67 - 95)  BP: 140/94 (10 Apr 2019 05:26) (126/90 - 140/94)  BP(mean): --  RR: 18 (10 Apr 2019 05:26) (18 - 18)  SpO2: 97% (10 Apr 2019 05:26) (97% - 98%)    General Exam:   General appearance: No acute distress                 Cardiovascular: Pedal dorsalis pulses intact bilaterally    Neurological Exam:  Mental Status: Awake, alert. Making grunting noises and pointing at the clock. Attention intact. Able to follow commands.   Cranial Nerves:   L pupil 4mm and briskly reactive. R pupil 5mm and reactive. EOMI, +Blink to threat bilaterally. No nystagmus.  No facial asymmetry.      Motor:   Tone: normal.                  Strength:     [] Upper extremity                      Delt       Bicep    Tricep                                                  R         5/5        5/5        5/5       5/5                                               L          5/5        5/5        5/5       5/5  [] Lower extremity                       HF          KE          KF        DF         PF                                               R        5/5        5/5        5/5       5/5       5/5                                               L         5/5        5/5       5/5       5/5        5/5  Pronator drift: none                 Dysmetria: None to finger-nose-finger   Tremor: No resting, postural or action tremor.  No myoclonus.    Sensation: intact to light touch    Deep Tendon Reflexes:     Biceps          Triceps      BR        Patellar        Ankle         Babinski                                         R       2+                   2+           2+            2+               2+           downgoing                                         L        2+                  2+           2+            2+               2+           downgoing    Gait: Deferred      Other:    04-10    145  |  109<H>  |  10  ----------------------------<  82  4.2   |  24  |  1.13    Ca    8.9      10 Apr 2019 06:35  Phos  3.1     04-10  Mg     1.9     04-10      04-10    145  |  109<H>  |  10  ----------------------------<  82  4.2   |  24  |  1.13    Ca    8.9      10 Apr 2019 06:35  Phos  3.1     04-10  Mg     1.9     04-10          Radiology    EKG:  tele:  TTE:  EEG:

## 2019-04-10 NOTE — PROGRESS NOTE ADULT - PROBLEM SELECTOR PLAN 3
Elevated BP likely due to recent discontinuation of BP meds, amlodipine/HCTZ.  - resume amlodipine 10mg daily for now. If does not adequately control BP, will also restart HCTZ  - monitor BP and mental status Elevated BP likely due to recent discontinuation of BP meds, amlodipine/HCTZ.  - resume amlodipine 10mg daily for now. If does not adequately control BP, will also restart HCTZ- once cpk normal  - monitor BP and mental status

## 2019-04-10 NOTE — EEG REPORT - NS EEG TEXT BOX
· EEG Report		  [facility]   REPORT OF CONTINUOUS VIDEO EEG     Study Date: 04-09-19-04-10-19    _____________________________________________________________  TECHNICAL INFORMATION    Placement and Labeling of Electrodes:  The EEG was performed utilizing 20 channels referential EEG connections (coronal over temporal over parasagittal montage) using all standard 10-20 electrode placements with EKG.  Recording was at a sampling rate of 256 samples per second per channel.  Time synchronized digital video recording was done simultaneously with EEG recording.  A low light infrared camera was used for low light recording.  Austyn and seizure detection algorithms were utilized.      _____________________________________________________________  STUDY INTERPRETATION    Findings: The background was continuous, spontaneously variable and reactive. Posterior dominant rhythm was a symmetrical, sustained 9hz activity at 30uv that attenuated to eye opening. Low amplitude frontal beta was present. Background consisted of alpha/beta frequencies,     Background slowing:  None    Sleep Background:  Drowsiness was characterized by fragmentation, attenuation, and slowing of the background activity.    Sleep was characterized by the presence of vertex waves, symmetric sleep spindles and K-complexes.    Other Non-Epileptiform Findings:  None    Interictal Epileptiform Activity:   None    Events:  Clinical events: None recorded.  Seizures: None recorded.    Activation Procedures:   Hyperventilation was performed and normal.   Photic stimulation was performed and normal.     Artifacts:  Intermittent myogenic and movement artifacts were noted.    ECG:  The heart rate on single channel ECG was predominantly between 70-90 BPM.    _____________________________________________________________  EEG SUMMARY/CLASSIFICATION  Normal EEG in the awake, drowsy, sleep statse.     _____________________________________________________________  EEG IMPRESSION/CLINICAL CORRELATE  1. Normal EEG  2. No epileptiform pattern or seizure seen.    Dana Kang MD  Clinical Neurophysiology Fellow · EEG Report		  [facility]   REPORT OF CONTINUOUS VIDEO EEG     Study Date: 04-09-19-04-10-19    _____________________________________________________________  TECHNICAL INFORMATION    Placement and Labeling of Electrodes:  The EEG was performed utilizing 20 channels referential EEG connections (coronal over temporal over parasagittal montage) using all standard 10-20 electrode placements with EKG.  Recording was at a sampling rate of 256 samples per second per channel.  Time synchronized digital video recording was done simultaneously with EEG recording.  A low light infrared camera was used for low light recording.  Austyn and seizure detection algorithms were utilized.      _____________________________________________________________  STUDY INTERPRETATION    Findings: The background was continuous, spontaneously variable and reactive. Posterior dominant rhythm was a symmetrical, sustained 9hz activity at 30uv that attenuated to eye opening. Low amplitude frontal beta was present. Background consisted of alpha/beta frequencies,     Background slowing:  None    Sleep Background:  Drowsiness was characterized by fragmentation, attenuation, and slowing of the background activity.    Sleep was characterized by the presence of vertex waves, symmetric sleep spindles and K-complexes.    Other Non-Epileptiform Findings:  None    Interictal Epileptiform Activity:   None    Events:  Clinical events: None recorded.  Seizures: None recorded.    Activation Procedures:   Hyperventilation was performed and normal.   Photic stimulation was performed and normal.     Artifacts:  Intermittent myogenic and movement artifacts were noted.    ECG:  The heart rate on single channel ECG was predominantly between 70-90 BPM.    _____________________________________________________________  EEG SUMMARY/CLASSIFICATION  Normal EEG in the awake, drowsy, sleep statse.     _____________________________________________________________  EEG IMPRESSION/CLINICAL CORRELATE  1. Normal EEG  2. No epileptiform pattern or seizure seen.    Dana Kang MD  Clinical Neurophysiology Fellow    Gavino Carroll MD  EEG / Epilepsy Attending Physician

## 2019-04-10 NOTE — PROGRESS NOTE ADULT - ATTENDING COMMENTS
Patient seen and examined with neurology team and above note reviewed and I agree with assessment and plan as outlined. Patient found to have sharp waves on EEG and so depakote 250mg BID will be started and monitored and plan discussed with patient and father at bedside and they understood plan.   Complete EEG today and continue seizure precautions. All questions answered. He will need to follow up in Epilepsy clinic at [facility] at [phone].

## 2019-04-11 LAB
ANION GAP SERPL CALC-SCNC: 9 MMO/L — SIGNIFICANT CHANGE UP (ref 7–14)
BACTERIA BLD CULT: SIGNIFICANT CHANGE UP
BACTERIA BLD CULT: SIGNIFICANT CHANGE UP
BUN SERPL-MCNC: 9 MG/DL — SIGNIFICANT CHANGE UP (ref 7–23)
CALCIUM SERPL-MCNC: 9.4 MG/DL — SIGNIFICANT CHANGE UP (ref 8.4–10.5)
CHLORIDE SERPL-SCNC: 107 MMOL/L — SIGNIFICANT CHANGE UP (ref 98–107)
CK SERPL-CCNC: 4856 U/L — HIGH (ref 30–200)
CK SERPL-CCNC: 6752 U/L — HIGH (ref 30–200)
CO2 SERPL-SCNC: 26 MMOL/L — SIGNIFICANT CHANGE UP (ref 22–31)
CREAT SERPL-MCNC: 1.21 MG/DL — SIGNIFICANT CHANGE UP (ref 0.5–1.3)
GLUCOSE SERPL-MCNC: 77 MG/DL — SIGNIFICANT CHANGE UP (ref 70–99)
POTASSIUM SERPL-MCNC: 4.2 MMOL/L — SIGNIFICANT CHANGE UP (ref 3.5–5.3)
POTASSIUM SERPL-SCNC: 4.2 MMOL/L — SIGNIFICANT CHANGE UP (ref 3.5–5.3)
SODIUM SERPL-SCNC: 142 MMOL/L — SIGNIFICANT CHANGE UP (ref 135–145)

## 2019-04-11 PROCEDURE — 95951: CPT | Mod: 26

## 2019-04-11 PROCEDURE — 99233 SBSQ HOSP IP/OBS HIGH 50: CPT

## 2019-04-11 RX ORDER — SODIUM CHLORIDE 9 MG/ML
1000 INJECTION INTRAMUSCULAR; INTRAVENOUS; SUBCUTANEOUS
Qty: 0 | Refills: 0 | Status: DISCONTINUED | OUTPATIENT
Start: 2019-04-11 | End: 2019-04-12

## 2019-04-11 RX ADMIN — AMLODIPINE BESYLATE 10 MILLIGRAM(S): 2.5 TABLET ORAL at 06:54

## 2019-04-11 RX ADMIN — SODIUM CHLORIDE 250 MILLILITER(S): 9 INJECTION INTRAMUSCULAR; INTRAVENOUS; SUBCUTANEOUS at 06:54

## 2019-04-11 RX ADMIN — SODIUM CHLORIDE 300 MILLILITER(S): 9 INJECTION INTRAMUSCULAR; INTRAVENOUS; SUBCUTANEOUS at 22:37

## 2019-04-11 RX ADMIN — DIVALPROEX SODIUM 250 MILLIGRAM(S): 500 TABLET, DELAYED RELEASE ORAL at 06:54

## 2019-04-11 RX ADMIN — DIVALPROEX SODIUM 250 MILLIGRAM(S): 500 TABLET, DELAYED RELEASE ORAL at 18:01

## 2019-04-11 NOTE — PROGRESS NOTE ADULT - SUBJECTIVE AND OBJECTIVE BOX
Patient is a 28y old  Male who presents with a chief complaint of Unwitnessed fall at home (11 Apr 2019 08:52)      SUBJECTIVE / OVERNIGHT EVENTS:  Parents at bedside.  Expained Cpk still elevated    MEDICATIONS  (STANDING):  amLODIPine   Tablet 10 milliGRAM(s) Oral daily  diVALproex  milliGRAM(s) Oral two times a day  heparin  Injectable 5000 Unit(s) SubCutaneous every 8 hours  sodium chloride 0.9%. 1000 milliLiter(s) (300 mL/Hr) IV Continuous <Continuous>    MEDICATIONS  (PRN):    Vital Signs Last 24 Hrs  T(C): 36.9 (10 Apr 2019 21:08), Max: 36.9 (10 Apr 2019 21:08)  T(F): 98.5 (10 Apr 2019 21:08), Max: 98.5 (10 Apr 2019 21:08)  HR: 98 (10 Apr 2019 21:08) (96 - 98)  BP: 135/97 (10 Apr 2019 21:08) (135/97 - 153/79)  BP(mean): --  RR: 18 (10 Apr 2019 21:08) (16 - 18)  SpO2: 97% (10 Apr 2019 21:08) (97% - 100%)      PHYSICAL EXAM:  GENERAL: NAD, well-developed  HEAD:  Atraumatic, Normocephalic  EYES: EOMI, PERRLA, conjunctiva and sclera clear  NECK: Supple, No JVD  CHEST/LUNG: Clear to auscultation bilaterally; No wheeze  HEART: Regular rate and rhythm; No murmurs, rubs, or gallops  ABDOMEN: Soft, Nontender, Nondistended; Bowel sounds present  EXTREMITIES:  2+ Peripheral Pulses, No clubbing, cyanosis, or edema  PSYCH: Alert  NEUROLOGY: non-focal  SKIN: No rashes or lesions    LABS:                        12.8   6.49  )-----------( 275      ( 10 Apr 2019 06:35 )             42.9     04-11    142  |  107  |  9   ----------------------------<  77  4.2   |  26  |  1.21    Ca    9.4      11 Apr 2019 07:10  Phos  3.1     04-10  Mg     1.9     04-10        CARDIAC MARKERS ( 11 Apr 2019 07:10 )  x     / x     / 6752 u/L / x     / x      CARDIAC MARKERS ( 10 Apr 2019 06:35 )  x     / x     / 6035 u/L / x     / x              RADIOLOGY & ADDITIONAL TESTS:    Imaging Personally Reviewed:    Consultant(s) Notes Reviewed:      Care Discussed with Consultants/Other Providers:

## 2019-04-11 NOTE — PROGRESS NOTE ADULT - PROBLEM SELECTOR PLAN 3
Elevated BP likely due to recent discontinuation of BP meds, amlodipine/HCTZ.  - resume amlodipine 10mg daily for now. If does not adequately control BP, will also restart HCTZ- once cpk normal  - monitor BP and mental status

## 2019-04-11 NOTE — PROGRESS NOTE ADULT - PROBLEM SELECTOR PLAN 7
Cpk [id number] after peaking at >6000- Rhabdo- d/w Renal change IVF to NS- Renal appreciated
Cpk 6035- Rhabdo- d/w Renal change IVF to NS- Renal appreciated
Cpk 6035- Rhabdo- d/w Renal change IVF to NS- Renal appreciated
cpk [phone].  NS at 150 cc/hr and Renal consult

## 2019-04-11 NOTE — PROGRESS NOTE ADULT - ATTENDING COMMENTS
Addendum 4/12/19   Patient had Other Acute encephalopathy on admission with elevated lactic acid secondary to Seizure present on admission- Lactic acidosis resolved quickly with hydration.  Encephalopathy has resolved.

## 2019-04-11 NOTE — PROGRESS NOTE ADULT - ASSESSMENT
Mr. Grijalva is a 29 yo man with a hx of autism and HTN brought in by EMS after unwitnessed fall at home with acute encephalopathy, highly suspicious for seizure activity, found to be febrile, meeting SIRS criteria in addition to hypertensive urgency vs. hypertensive emergency on presentation. Pt is back to baseline with negative infectious w/u thus far.  Cpk 6752 d/w Renal change IVF to NS- Renal appreciated  RVP/ urine/ blood cult- negative Mr. Grijalva is a 27 yo man with a hx of autism and HTN brought in by EMS after unwitnessed fall at home with acute encephalopathy, highly suspicious for seizure activity, found to be febrile, meeting SIRS criteria in addition to hypertensive urgency vs. hypertensive emergency on presentation. Pt is back to baseline with negative infectious w/u thus far.  Cpk 6752 d/w Renal change IVF to NS- Renal appreciated  RVP/ urine/ blood cult- negative  Patient had Acute encephalopathy on admission with elevated lactic acid secondary to Seizure present on admission

## 2019-04-11 NOTE — PROGRESS NOTE ADULT - PROBLEM SELECTOR PROBLEM 7
Non-traumatic rhabdomyolysis

## 2019-04-11 NOTE — PROGRESS NOTE ADULT - PROBLEM SELECTOR PLAN 1
Now resolved. Patient at baseline mentation. History and exam most c/w seizure activity though. Unclear etiology for precipitating factor. Given elevated BP, seizure may have been manifestation of hypertensive emergency in the setting of discontinued antihypertensive meds. Possible occult infection given fever, but pt with normal WBC, negative UA, CXR and RVP and has no localizing symptoms. No electrolyte abnormalities. CT head negative for acute mass, infarct or hemorrhage.   - obtain EEG   Neurology  Recommendations:  MRI Brain w/ and w/o contrast (Temporal Lobe Epilepsy Protocol)- no lesions  Video EEG- notes Seizure - start Depakote

## 2019-04-11 NOTE — PROGRESS NOTE ADULT - SUBJECTIVE AND OBJECTIVE BOX
No pain, no shortness of breath      VITAL:  T(C): , Max: 36.9 (04-10-19 @ 21:08)  T(F): , Max: 98.5 (04-10-19 @ 21:08)  HR: 98 (04-10-19 @ 21:08)  BP: 135/97 (04-10-19 @ 21:08)  RR: 18 (04-10-19 @ 21:08)  SpO2: 97% (04-10-19 @ 21:08)      PHYSICAL EXAM:  Constitutional: NAD; Alert  HEENT:  NCAT; DMM  Neck: No JVD; supple  Respiratory: CTA-b/l  Cardiac: RRR s1s2  Gastrointestinal: BS+, soft, NT/ND  Urologic: No lu  Extremities: No peripheral edema  Back: No CVAT b/l      LABS:                        12.8   6.49  )-----------( 275      ( 10 Apr 2019 06:35 )             42.9     Na(142)/K(4.2)/Cl(107)/HCO3(26)/BUN(9)/Cr(1.21)Glu(77)/Ca(9.4)/Mg(--)/PO4(--)    04-11 @ 07:10  Na(145)/K(4.2)/Cl(109)/HCO3(24)/BUN(10)/Cr(1.13)Glu(82)/Ca(8.9)/Mg(1.9)/PO4(3.1)    04-10 @ 06:35  Na(143)/K(4.4)/Cl(109)/HCO3(25)/BUN(11)/Cr(1.19)Glu(88)/Ca(9.0)/Mg(1.9)/PO4(3.0)    04-09 @ 06:40  Na(140)/K(4.2)/Cl(104)/HCO3(26)/BUN(14)/Cr(1.36)Glu(110)/Ca(9.2)/Mg(--)/PO4(--)    04-08 @ 16:00    CPK 6752      IMPRESSION: 28M w/ HTN, autism, and seizures in distant past, 4/6/19 a/w fall/seziure, c/b rhadomyolysis    (1)Rhabdomyolysis - due to seizure/fall - despite NS 250cc/h, CPK still uptrending  (2)HTN - mild/acceptable for now, on high-dose Norvasc      RECOMMEND:  (1)Increase NS to 300cc/h  (2)CPK q12h            Jonah Powell MD  [facility],   [phone]

## 2019-04-12 LAB
ANION GAP SERPL CALC-SCNC: 11 MMO/L — SIGNIFICANT CHANGE UP (ref 7–14)
BUN SERPL-MCNC: 11 MG/DL — SIGNIFICANT CHANGE UP (ref 7–23)
CALCIUM SERPL-MCNC: 8.9 MG/DL — SIGNIFICANT CHANGE UP (ref 8.4–10.5)
CHLORIDE SERPL-SCNC: 111 MMOL/L — HIGH (ref 98–107)
CK SERPL-CCNC: 2313 U/L — HIGH (ref 30–200)
CK SERPL-CCNC: 3043 U/L — HIGH (ref 30–200)
CO2 SERPL-SCNC: 22 MMOL/L — SIGNIFICANT CHANGE UP (ref 22–31)
CREAT SERPL-MCNC: 1.11 MG/DL — SIGNIFICANT CHANGE UP (ref 0.5–1.3)
GLUCOSE SERPL-MCNC: 83 MG/DL — SIGNIFICANT CHANGE UP (ref 70–99)
HCT VFR BLD CALC: 41.5 % — SIGNIFICANT CHANGE UP (ref 39–50)
HGB BLD-MCNC: 12.4 G/DL — LOW (ref 13–17)
MCHC RBC-ENTMCNC: 23 PG — LOW (ref 27–34)
MCHC RBC-ENTMCNC: 29.9 % — LOW (ref 32–36)
MCV RBC AUTO: 77 FL — LOW (ref 80–100)
NRBC # FLD: 0 K/UL — SIGNIFICANT CHANGE UP (ref 0–0)
PLATELET # BLD AUTO: 258 K/UL — SIGNIFICANT CHANGE UP (ref 150–400)
PMV BLD: 11.9 FL — SIGNIFICANT CHANGE UP (ref 7–13)
POTASSIUM SERPL-MCNC: 4.6 MMOL/L — SIGNIFICANT CHANGE UP (ref 3.5–5.3)
POTASSIUM SERPL-SCNC: 4.6 MMOL/L — SIGNIFICANT CHANGE UP (ref 3.5–5.3)
RBC # BLD: 5.39 M/UL — SIGNIFICANT CHANGE UP (ref 4.2–5.8)
RBC # FLD: 17.1 % — HIGH (ref 10.3–14.5)
SODIUM SERPL-SCNC: 144 MMOL/L — SIGNIFICANT CHANGE UP (ref 135–145)
WBC # BLD: 5.92 K/UL — SIGNIFICANT CHANGE UP (ref 3.8–10.5)
WBC # FLD AUTO: 5.92 K/UL — SIGNIFICANT CHANGE UP (ref 3.8–10.5)

## 2019-04-12 PROCEDURE — 99233 SBSQ HOSP IP/OBS HIGH 50: CPT

## 2019-04-12 RX ORDER — SODIUM CHLORIDE 9 MG/ML
1000 INJECTION INTRAMUSCULAR; INTRAVENOUS; SUBCUTANEOUS
Qty: 0 | Refills: 0 | Status: DISCONTINUED | OUTPATIENT
Start: 2019-04-12 | End: 2019-04-13

## 2019-04-12 RX ADMIN — DIVALPROEX SODIUM 250 MILLIGRAM(S): 500 TABLET, DELAYED RELEASE ORAL at 17:09

## 2019-04-12 RX ADMIN — AMLODIPINE BESYLATE 10 MILLIGRAM(S): 2.5 TABLET ORAL at 06:07

## 2019-04-12 RX ADMIN — SODIUM CHLORIDE 150 MILLILITER(S): 9 INJECTION INTRAMUSCULAR; INTRAVENOUS; SUBCUTANEOUS at 17:09

## 2019-04-12 RX ADMIN — SODIUM CHLORIDE 300 MILLILITER(S): 9 INJECTION INTRAMUSCULAR; INTRAVENOUS; SUBCUTANEOUS at 06:00

## 2019-04-12 RX ADMIN — DIVALPROEX SODIUM 250 MILLIGRAM(S): 500 TABLET, DELAYED RELEASE ORAL at 06:07

## 2019-04-12 NOTE — PROGRESS NOTE ADULT - SUBJECTIVE AND OBJECTIVE BOX
No pain, no shortness of breath      VITAL:  T(C): , Max: 37.3 (04-11-19 @ 21:14)  T(F): , Max: 99.1 (04-11-19 @ 21:14)  HR: 72 (04-12-19 @ 05:34)  BP: 132/84 (04-12-19 @ 05:34)  BP(mean): --  RR: 18 (04-12-19 @ 05:34)  SpO2: 100% (04-12-19 @ 05:34)      PHYSICAL EXAM:  Constitutional: NAD; Alert  HEENT:  NCAT; DMM  Neck: No JVD; supple  Respiratory: CTA-b/l  Cardiac: RRR s1s2  Gastrointestinal: BS+, soft, NT/ND  Urologic: No lu  Extremities: No peripheral edema  Back: No CVAT b/l      LABS:                        12.4   5.92  )-----------( 258      ( 12 Apr 2019 06:35 )             41.5     Na(144)/K(4.6)/Cl(111)/HCO3(22)/BUN(11)/Cr(1.11)Glu(83)/Ca(8.9)/Mg(--)/PO4(--)    04-12 @ 06:35  Na(142)/K(4.2)/Cl(107)/HCO3(26)/BUN(9)/Cr(1.21)Glu(77)/Ca(9.4)/Mg(--)/PO4(--)    04-11 @ 07:10  Na(145)/K(4.2)/Cl(109)/HCO3(24)/BUN(10)/Cr(1.13)Glu(82)/Ca(8.9)/Mg(1.9)/PO4(3.1)    04-10 @ 06:35    CPK 3043<==6752    IMPRESSION: 28M w/ HTN, autism, and seizures in distant past, 4/6/19 a/w fall/seziure, c/b rhadomyolysis    (1)Rhabdomyolysis - due to seizure/fall - now resolving, on high-dose IVF  (2)HTN - controlled, on high-dose Norvasc      RECOMMEND:  (1)Decrease IVF to 150cc/h  (2)CPK q12h; if CPK continuing to trend down, could try stopping the IVF altogether; if CPK continuing to decline 12 hours post stopping IVF, then at that point I would have no objection to discharge                Jonah Powell MD  [facility],   [phone]

## 2019-04-12 NOTE — PROGRESS NOTE ADULT - ASSESSMENT
Mr. Grijalva is a 29 yo man with a hx of autism and HTN brought in by EMS after unwitnessed fall at home with acute encephalopathy, highly suspicious for seizure activity, found to be febrile, meeting SIRS criteria in addition to hypertensive urgency vs. hypertensive emergency on presentation. Pt is back to baseline with negative infectious w/u thus far. RVP/ urine/ blood cult- negative. Patient had Acute encephalopathy on admission with elevated lactic acid secondary to Seizure present on admission

## 2019-04-12 NOTE — PROGRESS NOTE ADULT - SUBJECTIVE AND OBJECTIVE BOX
Patient is a 28y old  Male who presents with a chief complaint of Unwitnessed fall at home (11 Apr 2019 09:25)      SUBJECTIVE / OVERNIGHT EVENTS:  Patient seen and examined this morning. Non verbal at baseline and does not contribute to history. Family at bedside, their questions were answered to their satisfaction.      MEDICATIONS  (STANDING):  amLODIPine   Tablet 10 milliGRAM(s) Oral daily  diVALproex  milliGRAM(s) Oral two times a day  heparin  Injectable 5000 Unit(s) SubCutaneous every 8 hours  sodium chloride 0.9%. 1000 milliLiter(s) (300 mL/Hr) IV Continuous <Continuous>    MEDICATIONS  (PRN):      PHYSICAL EXAM:  T(C): 36.7 (04-12-19 @ 05:34), Max: 37.3 (04-11-19 @ 21:14)  HR: 72 (04-12-19 @ 05:34) (72 - 98)  BP: 132/84 (04-12-19 @ 05:34) (122/78 - 138/82)  RR: 18 (04-12-19 @ 05:34) (16 - 18)  SpO2: 100% (04-12-19 @ 05:34) (97% - 100%)  I&O's Summary    GENERAL: NAD, well-developed  EYES: conjunctiva and sclera clear  NECK: Supple, No elevated JVD  CHEST/LUNG: Clear to auscultation bilaterally; No wheeze  HEART: Regular rate and rhythm; No murmurs, rubs, or gallops  ABDOMEN: Soft, Nondistended; Bowel sounds present  EXTREMITIES:  No clubbing, cyanosis, or edema  PSYCH: Alert, non verbal  NEUROLOGY: CN II-XII grossly intact, moving all extremities    LABS:  CAPILLARY BLOOD GLUCOSE                              12.4   5.92  )-----------( 258      ( 12 Apr 2019 06:35 )             41.5     04-12    144  |  111<H>  |  11  ----------------------------<  83  4.6   |  22  |  1.11    Ca    8.9      12 Apr 2019 06:35        CARDIAC MARKERS ( 12 Apr 2019 06:35 )  x     / x     / 3043 u/L / x     / x      CARDIAC MARKERS ( 11 Apr 2019 18:14 )  x     / x     / 4856 u/L / x     / x      CARDIAC MARKERS ( 11 Apr 2019 07:10 )  x     / x     / 6752 u/L / x     / x              RADIOLOGY & ADDITIONAL TESTS:    Imaging Personally Reviewed:    Consultant(s) Notes Reviewed:      Care Discussed with Consultants/Other Providers: Case discussed during interdisciplinary rounds with social work and case management

## 2019-04-13 LAB
ANION GAP SERPL CALC-SCNC: 11 MMO/L — SIGNIFICANT CHANGE UP (ref 7–14)
BUN SERPL-MCNC: 13 MG/DL — SIGNIFICANT CHANGE UP (ref 7–23)
CALCIUM SERPL-MCNC: 9.4 MG/DL — SIGNIFICANT CHANGE UP (ref 8.4–10.5)
CHLORIDE SERPL-SCNC: 108 MMOL/L — HIGH (ref 98–107)
CK SERPL-CCNC: 1386 U/L — HIGH (ref 30–200)
CK SERPL-CCNC: 902 U/L — HIGH (ref 30–200)
CO2 SERPL-SCNC: 24 MMOL/L — SIGNIFICANT CHANGE UP (ref 22–31)
CREAT SERPL-MCNC: 1.24 MG/DL — SIGNIFICANT CHANGE UP (ref 0.5–1.3)
GLUCOSE SERPL-MCNC: 85 MG/DL — SIGNIFICANT CHANGE UP (ref 70–99)
HCT VFR BLD CALC: 44.4 % — SIGNIFICANT CHANGE UP (ref 39–50)
HGB BLD-MCNC: 13.2 G/DL — SIGNIFICANT CHANGE UP (ref 13–17)
MCHC RBC-ENTMCNC: 23.4 PG — LOW (ref 27–34)
MCHC RBC-ENTMCNC: 29.7 % — LOW (ref 32–36)
MCV RBC AUTO: 78.6 FL — LOW (ref 80–100)
NRBC # FLD: 0 K/UL — SIGNIFICANT CHANGE UP (ref 0–0)
PLATELET # BLD AUTO: 264 K/UL — SIGNIFICANT CHANGE UP (ref 150–400)
PMV BLD: 11.8 FL — SIGNIFICANT CHANGE UP (ref 7–13)
POTASSIUM SERPL-MCNC: 4.3 MMOL/L — SIGNIFICANT CHANGE UP (ref 3.5–5.3)
POTASSIUM SERPL-SCNC: 4.3 MMOL/L — SIGNIFICANT CHANGE UP (ref 3.5–5.3)
RBC # BLD: 5.65 M/UL — SIGNIFICANT CHANGE UP (ref 4.2–5.8)
RBC # FLD: 17.7 % — HIGH (ref 10.3–14.5)
SODIUM SERPL-SCNC: 143 MMOL/L — SIGNIFICANT CHANGE UP (ref 135–145)
WBC # BLD: 6.4 K/UL — SIGNIFICANT CHANGE UP (ref 3.8–10.5)
WBC # FLD AUTO: 6.4 K/UL — SIGNIFICANT CHANGE UP (ref 3.8–10.5)

## 2019-04-13 PROCEDURE — 99233 SBSQ HOSP IP/OBS HIGH 50: CPT

## 2019-04-13 RX ADMIN — DIVALPROEX SODIUM 250 MILLIGRAM(S): 500 TABLET, DELAYED RELEASE ORAL at 17:34

## 2019-04-13 RX ADMIN — SODIUM CHLORIDE 150 MILLILITER(S): 9 INJECTION INTRAMUSCULAR; INTRAVENOUS; SUBCUTANEOUS at 07:25

## 2019-04-13 RX ADMIN — AMLODIPINE BESYLATE 10 MILLIGRAM(S): 2.5 TABLET ORAL at 07:25

## 2019-04-13 RX ADMIN — DIVALPROEX SODIUM 250 MILLIGRAM(S): 500 TABLET, DELAYED RELEASE ORAL at 07:25

## 2019-04-13 NOTE — PROGRESS NOTE ADULT - SUBJECTIVE AND OBJECTIVE BOX
Patient seen and examined in bed; no new events overnight.  NAD.    REVIEW OF SYSTEMS:  As per HPI, otherwise 8 full 10 ROS were unremarkable    MEDICATIONS  (STANDING):  amLODIPine   Tablet 10 milliGRAM(s) Oral daily  diVALproex  milliGRAM(s) Oral two times a day  heparin  Injectable 5000 Unit(s) SubCutaneous every 8 hours      VITAL:  T(C): , Max: 37.2 (04-12-19 @ 21:47)  T(F): , Max: 99 (04-12-19 @ 21:47)  HR: 73 (04-13-19 @ 07:24)  BP: 128/83 (04-13-19 @ 07:24)  BP(mean): --  RR: 18 (04-13-19 @ 07:24)  SpO2: 98% (04-13-19 @ 07:24)  Wt(kg): --    I and O's:        PHYSICAL EXAM:    Constitutional: NAD  HEENT: PERRLA, EOMI,  MMM  Neck: No LAD, No JVD  Respiratory: CTAB  Cardiovascular: S1 and S2  Gastrointestinal: BS+, soft, NT/ND  Extremities: No peripheral edema  Neurological: A/O x 3, no focal deficits  Psychiatric: Normal mood, normal affect  : No Brandon  Skin: No rashes  Access: Not applicable    LABS:                        13.2   6.40  )-----------( 264      ( 13 Apr 2019 07:22 )             44.4     04-13    143  |  108<H>  |  13  ----------------------------<  85  4.3   |  24  |  1.24    Ca    9.4      13 Apr 2019 07:22        IMPRESSION: 28M w/ HTN, autism, and seizures in distant past, 4/6/19 a/w fall/seziure, c/b rhadomyolysis    (1)Rhabdomyolysis - due to seizure/fall - recently on IVFs; improved/resolving  (2)HTN - controlled; BPs acceptable;  on high-dose Norvasc    RECOMMEND:  (1)A/w discontinuation of IVFs; will trend CPK for now  (2)CPK q12h for now; if CPK continues to decline now that IVFs discontinued (12 hours post); no renal objection for discharge.  Planning per primary team.  Have patient f/u with PCP in 1 week.

## 2019-04-13 NOTE — PROGRESS NOTE ADULT - ASSESSMENT
Mr. Grijalva is a 27 yo man with a hx of autism and HTN brought in by EMS after unwitnessed fall at home with acute encephalopathy, highly suspicious for seizure activity, found to be febrile, meeting SIRS criteria in addition to hypertensive urgency vs. hypertensive emergency on presentation. Pt is back to baseline with negative infectious w/u thus far. RVP/ urine/ blood cult- negative. Patient had Acute encephalopathy on admission with elevated lactic acid secondary to Seizure present on admission

## 2019-04-13 NOTE — PROGRESS NOTE ADULT - SUBJECTIVE AND OBJECTIVE BOX
Patient is a 28y old  Male who presents with a chief complaint of Unwitnessed fall at home (13 Apr 2019 08:55)      SUBJECTIVE / OVERNIGHT EVENTS:  Patient seen and examined this morning. Non verbal at baseline and does not contribute to history. Father at bedside, his questions were answered to their satisfaction.      MEDICATIONS  (STANDING):  amLODIPine   Tablet 10 milliGRAM(s) Oral daily  diVALproex  milliGRAM(s) Oral two times a day  heparin  Injectable 5000 Unit(s) SubCutaneous every 8 hours    MEDICATIONS  (PRN):      PHYSICAL EXAM:  T(C): 36.5 (04-13-19 @ 07:24), Max: 37.2 (04-12-19 @ 21:47)  HR: 73 (04-13-19 @ 07:24) (73 - 97)  BP: 128/83 (04-13-19 @ 07:24) (125/66 - 128/83)  RR: 18 (04-13-19 @ 07:24) (16 - 18)  SpO2: 98% (04-13-19 @ 07:24) (98% - 98%)  I&O's Summary    GENERAL: NAD, well-developed  EYES: conjunctiva and sclera clear  NECK: Supple, No elevated JVD  CHEST/LUNG: Clear to auscultation bilaterally; No wheeze  HEART: Regular rate and rhythm; No murmurs, rubs, or gallops  ABDOMEN: Soft, Nondistended; Bowel sounds present  EXTREMITIES:  No clubbing, cyanosis, or edema  PSYCH: Alert, non verbal, able to acknowledge my presence by a waive    NEUROLOGY: CN II-XII grossly intact, moving all extremities    LABS:  CAPILLARY BLOOD GLUCOSE                              13.2   6.40  )-----------( 264      ( 13 Apr 2019 07:22 )             44.4     04-13    143  |  108<H>  |  13  ----------------------------<  85  4.3   |  24  |  1.24    Ca    9.4      13 Apr 2019 07:22        CARDIAC MARKERS ( 13 Apr 2019 07:22 )  x     / x     / 1386 u/L / x     / x      CARDIAC MARKERS ( 12 Apr 2019 18:36 )  x     / x     / 2313 u/L / x     / x      CARDIAC MARKERS ( 12 Apr 2019 06:35 )  x     / x     / 3043 u/L / x     / x      CARDIAC MARKERS ( 11 Apr 2019 18:14 )  x     / x     / 4856 u/L / x     / x              RADIOLOGY & ADDITIONAL TESTS:    Imaging Personally Reviewed:    Consultant(s) Notes Reviewed:      Care Discussed with Consultants/Other Providers:

## 2019-04-14 ENCOUNTER — TRANSCRIPTION ENCOUNTER (OUTPATIENT)
Age: 29
End: 2019-04-14

## 2019-04-14 VITALS
DIASTOLIC BLOOD PRESSURE: 95 MMHG | HEART RATE: 91 BPM | OXYGEN SATURATION: 100 % | TEMPERATURE: 98 F | SYSTOLIC BLOOD PRESSURE: 148 MMHG | RESPIRATION RATE: 17 BRPM

## 2019-04-14 LAB
ANION GAP SERPL CALC-SCNC: 13 MMO/L — SIGNIFICANT CHANGE UP (ref 7–14)
BUN SERPL-MCNC: 13 MG/DL — SIGNIFICANT CHANGE UP (ref 7–23)
CALCIUM SERPL-MCNC: 9.3 MG/DL — SIGNIFICANT CHANGE UP (ref 8.4–10.5)
CHLORIDE SERPL-SCNC: 102 MMOL/L — SIGNIFICANT CHANGE UP (ref 98–107)
CK SERPL-CCNC: 660 U/L — HIGH (ref 30–200)
CO2 SERPL-SCNC: 24 MMOL/L — SIGNIFICANT CHANGE UP (ref 22–31)
CREAT SERPL-MCNC: 1.26 MG/DL — SIGNIFICANT CHANGE UP (ref 0.5–1.3)
GLUCOSE SERPL-MCNC: 88 MG/DL — SIGNIFICANT CHANGE UP (ref 70–99)
HCT VFR BLD CALC: 44.1 % — SIGNIFICANT CHANGE UP (ref 39–50)
HGB BLD-MCNC: 13.3 G/DL — SIGNIFICANT CHANGE UP (ref 13–17)
MCHC RBC-ENTMCNC: 23.4 PG — LOW (ref 27–34)
MCHC RBC-ENTMCNC: 30.2 % — LOW (ref 32–36)
MCV RBC AUTO: 77.5 FL — LOW (ref 80–100)
NRBC # FLD: 0 K/UL — SIGNIFICANT CHANGE UP (ref 0–0)
PLATELET # BLD AUTO: 312 K/UL — SIGNIFICANT CHANGE UP (ref 150–400)
PMV BLD: 12.6 FL — SIGNIFICANT CHANGE UP (ref 7–13)
POTASSIUM SERPL-MCNC: 4.2 MMOL/L — SIGNIFICANT CHANGE UP (ref 3.5–5.3)
POTASSIUM SERPL-SCNC: 4.2 MMOL/L — SIGNIFICANT CHANGE UP (ref 3.5–5.3)
RBC # BLD: 5.69 M/UL — SIGNIFICANT CHANGE UP (ref 4.2–5.8)
RBC # FLD: 17.2 % — HIGH (ref 10.3–14.5)
SODIUM SERPL-SCNC: 139 MMOL/L — SIGNIFICANT CHANGE UP (ref 135–145)
WBC # BLD: 7.55 K/UL — SIGNIFICANT CHANGE UP (ref 3.8–10.5)
WBC # FLD AUTO: 7.55 K/UL — SIGNIFICANT CHANGE UP (ref 3.8–10.5)

## 2019-04-14 PROCEDURE — 99239 HOSP IP/OBS DSCHRG MGMT >30: CPT

## 2019-04-14 RX ORDER — DIVALPROEX SODIUM 500 MG/1
1 TABLET, DELAYED RELEASE ORAL
Qty: 60 | Refills: 0
Start: 2019-04-14 | End: 2020-01-21

## 2019-04-14 RX ORDER — DIVALPROEX SODIUM 500 MG/1
1 TABLET, DELAYED RELEASE ORAL
Qty: 60 | Refills: 0
Start: 2019-04-14 | End: 2019-05-13

## 2019-04-14 RX ADMIN — DIVALPROEX SODIUM 250 MILLIGRAM(S): 500 TABLET, DELAYED RELEASE ORAL at 07:26

## 2019-04-14 RX ADMIN — AMLODIPINE BESYLATE 10 MILLIGRAM(S): 2.5 TABLET ORAL at 07:26

## 2019-04-14 NOTE — PROGRESS NOTE ADULT - REASON FOR ADMISSION
Unwitnessed fall at home

## 2019-04-14 NOTE — PROGRESS NOTE ADULT - PROBLEM SELECTOR PROBLEM 3
Metabolic acidosis
Hypertensive urgency
Metabolic acidosis
Hypertensive urgency
Hypertensive urgency
Metabolic acidosis

## 2019-04-14 NOTE — PROGRESS NOTE ADULT - PROBLEM SELECTOR PLAN 2
Now resolved  Will f/u BPs and adjust meds as needed
Now resolved  Will f/u BPs and adjust meds as needed
Patient met criteria on presentation. Likely due to acute seizure. However, will need to r/o sepsis. Pt s/p dose of Zosyn in the ED.  - f/u blood cultures x2 and urine cx  - hold further abx since pt hemodynamically stable and asymptomatic. Monitor closely. If pt decompensates clinically, would restart empiric abx.
Patient met criteria on presentation. Likely due to acute seizure. However, will need to r/o sepsis. Pt s/p dose of Zosyn in the ED.  - f/u blood cultures x2 and urine cx  - hold further abx since pt hemodynamically stable and asymptomatic. Monitor closely. If pt decompensates clinically, would restart empiric abx.
Pt s/p dose of Zosyn x1 in the ED.  - blood cultures x2 and urine cx- negative  -NO SEPSIS ON ADMISSION
Patient met criteria on presentation. Likely due to acute seizure. However, will need to r/o sepsis. Pt s/p dose of Zosyn in the ED.  - f/u blood cultures x2 and urine cx  - hold further abx since pt hemodynamically stable and asymptomatic. Monitor closely. If pt decompensates clinically, would restart empiric abx.
Pt s/p dose of Zosyn x1 in the ED.  - blood cultures x2 and urine cx- negative  -NO SEPSIS ON ADMISSION
Now resolved  Will f/u BPs and adjust meds as needed

## 2019-04-14 NOTE — PROGRESS NOTE ADULT - PROBLEM SELECTOR PROBLEM 6
Non-traumatic rhabdomyolysis
Prophylactic measure

## 2019-04-14 NOTE — PROGRESS NOTE ADULT - PROBLEM SELECTOR PLAN 4
JUSTIN sec to Rhabdo- RESOLVED  IVF hydration.  Renal consult appreciated  - renally dose meds as needed.
JUSTIN sec to Rhabdo- RESOLVED  IVF hydration.  Renal consult appreciated  - renally dose meds as needed.
JUSTIN sec to Rhabdo- RESOLVED  Renal consult appreciated
Noted increase in lactate, which is likely byproduct of seizure activity.   Resolved.
Noted increase in lactate, which is likely byproduct of seizure activity. However, cannot exclude infectious source. Lactate now WNL after IV fluids.  - monitor bicarb level
Noted increase in lactate, which is likely byproduct of seizure activity. However, cannot exclude infectious source. Lactate now WNL after IV fluids.  - monitor bicarb level
Noted increase in lactate, which is likely byproduct of seizure activity.   Resolved.
Noted increase in lactate, which is likely byproduct of seizure activity. However, cannot exclude infectious source. Lactate now WNL after IV fluids.  - monitor bicarb level
(0) understands/communicates without difficulty

## 2019-04-14 NOTE — PROGRESS NOTE ADULT - PROBLEM SELECTOR PROBLEM 5
Prophylactic measure
Prophylactic measure
Creatinine elevation
Prophylactic measure
Creatinine elevation
Creatinine elevation

## 2019-04-14 NOTE — PROGRESS NOTE ADULT - PROBLEM SELECTOR PLAN 5
Low risk  Will monitor
Low risk  Will monitor
Baseline Cr unknown. Cannot confirm JUSTIN vs. underlying CKD at this time. Pt s/p IV fluid boluses  - monitor Cr  - renally dose meds as needed.
JUSTIN sec to Rhabdo  IVF hydration.  Renal consult appreciated  - renally dose meds as needed.
JUSTIN sec to Rhabdo- RESOLVED  IVF hydration.  Renal consult appreciated  - renally dose meds as needed.
Low risk  Will monitor
JUSTIN sec to Rhabdo  IVF hydration.  Renal consult  - renally dose meds as needed.
JUSTIN sec to Rhabdo- RESOLVED  IVF hydration.  Renal consult appreciated  - renally dose meds as needed.

## 2019-04-14 NOTE — PROGRESS NOTE ADULT - PROVIDER SPECIALTY LIST ADULT
Hospitalist
Nephrology
Neurology
Hospitalist
Hospitalist

## 2019-04-14 NOTE — PROGRESS NOTE ADULT - PROBLEM SELECTOR PROBLEM 1
Encephalopathy acute
No complaints

## 2019-04-14 NOTE — DISCHARGE NOTE NURSING/CASE MANAGEMENT/SOCIAL WORK - NSDCDPATPORTLINK_GEN_ALL_CORE
You can access the [facility] Patient Portal, offered by [facility], by registering with the following website: [email or web address]

## 2019-04-14 NOTE — PROGRESS NOTE ADULT - PROBLEM SELECTOR PROBLEM 4
Creatinine elevation
Metabolic acidosis

## 2019-04-14 NOTE — PROGRESS NOTE ADULT - SUBJECTIVE AND OBJECTIVE BOX
Patient seen and examined    REVIEW OF SYSTEMS:  As per HPI, otherwise 8 full 10 ROS were unremarkable    MEDICATIONS  (STANDING):  amLODIPine   Tablet 10 milliGRAM(s) Oral daily  diVALproex  milliGRAM(s) Oral two times a day  heparin  Injectable 5000 Unit(s) SubCutaneous every 8 hours      VITAL:  T(C): , Max: 36.8 (04-13-19 @ 15:09)  T(F): , Max: 98.3 (04-13-19 @ 15:09)  HR: 91 (04-14-19 @ 07:36)  BP: 148/95 (04-14-19 @ 07:36)  BP(mean): --  RR: 17 (04-14-19 @ 07:36)  SpO2: 100% (04-14-19 @ 07:36)  Wt(kg): --    I and O's:        PHYSICAL EXAM:    Constitutional: NAD; Alert  HEENT:  NCAT; DMM  Neck: No JVD; supple  Respiratory: CTA-b/l  Cardiac: RRR s1s2  Gastrointestinal: BS+, soft, NT/ND  Urologic: No lu  Extremities: No peripheral edema  Back: No CVAT b/l  applicable    LABS:                        13.3   7.55  )-----------( 312      ( 14 Apr 2019 06:30 )             44.1     04-14    139  |  102  |  13  ----------------------------<  88  4.2   |  24  |  1.26    Ca    9.3      14 Apr 2019 06:30    IMPRESSION: 28M w/ HTN, autism, and seizures in distant past, 4/6/19 a/w fall/seziure, c/b rhadomyolysis    (1)Rhabdomyolysis - due to seizure/fall - recently on IVFs;  this AM; improved/resolving  (2)HTN - controlled; BPs acceptable;  on high-dose Norvasc    RECOMMEND:  (1)No need for additional IVFs at this time  (2)Continue Norvasc 10 mg daily  (3)Discharge planning per primary team; no renal objection; have patient f/u with PCP in 1-2 weeks

## 2019-04-14 NOTE — PROGRESS NOTE ADULT - SUBJECTIVE AND OBJECTIVE BOX
Patient is a 28y old  Male who presents with a chief complaint of Unwitnessed fall at home (14 Apr 2019 10:07)      SUBJECTIVE / OVERNIGHT EVENTS:      MEDICATIONS  (STANDING):  amLODIPine   Tablet 10 milliGRAM(s) Oral daily  diVALproex  milliGRAM(s) Oral two times a day  heparin  Injectable 5000 Unit(s) SubCutaneous every 8 hours    MEDICATIONS  (PRN):      PHYSICAL EXAM:  T(C): 36.7 (04-14-19 @ 07:36), Max: 36.8 (04-13-19 @ 15:09)  HR: 91 (04-14-19 @ 07:36) (88 - 91)  BP: 148/95 (04-14-19 @ 07:36) (122/82 - 148/95)  RR: 17 (04-14-19 @ 07:36) (17 - 17)  SpO2: 100% (04-14-19 @ 07:36) (100% - 100%)  I&O's Summary    GENERAL: NAD, overweight  EYES: conjunctiva and sclera clear  NECK: Supple, No elevated JVD  CHEST/LUNG: Clear to auscultation bilaterally; No wheeze  HEART: Regular rate and rhythm; No murmurs, rubs, or gallops  ABDOMEN: Soft, Nondistended; Bowel sounds present  EXTREMITIES:  No clubbing, cyanosis, or edema  PSYCH: Alert, non verbal, able to acknowledge my presence by a waive    NEUROLOGY: CN II-XII grossly intact, moving all extremities    LABS:  CAPILLARY BLOOD GLUCOSE                              13.3   7.55  )-----------( 312      ( 14 Apr 2019 06:30 )             44.1     04-14    139  |  102  |  13  ----------------------------<  88  4.2   |  24  |  1.26    Ca    9.3      14 Apr 2019 06:30        CARDIAC MARKERS ( 14 Apr 2019 06:30 )  x     / x     / 660 u/L / x     / x      CARDIAC MARKERS ( 13 Apr 2019 22:15 )  x     / x     / 902 u/L / x     / x      CARDIAC MARKERS ( 13 Apr 2019 07:22 )  x     / x     / 1386 u/L / x     / x      CARDIAC MARKERS ( 12 Apr 2019 18:36 )  x     / x     / 2313 u/L / x     / x              RADIOLOGY & ADDITIONAL TESTS:    Imaging Personally Reviewed:    Consultant(s) Notes Reviewed:      Care Discussed with Consultants/Other Providers:

## 2019-04-14 NOTE — PROGRESS NOTE ADULT - PROBLEM SELECTOR PROBLEM 2
Hypertensive urgency
SIRS (systemic inflammatory response syndrome)
Hypertensive urgency
Hypertensive urgency

## 2019-04-14 NOTE — PROGRESS NOTE ADULT - PROBLEM SELECTOR PLAN 6
CK decreasing  IVF stopped  Will recheck, if no significant rise noted will consider dc possibly tomorrow or Monday
Will continue to monitor CPK and adjust fluids as needed. Nephro consult appreciated
IMPROVE VTE Individual Risk Assessment        RISK                                                          Points  [  ] Previous VTE                                               3  [  ] Thrombophilia                                            2  [  ] Lower limb paresis/paralysis                    2    [  ] Active Cancer (in last 6 months)              2   [  ] Immobilization > 24 hrs                             1  [  ] ICU/CCU stay > 24 hours                            1  [  ] Age > 60                                                        1                                            Total Score __0_______  - encourage ambulation for DVT ppx
IVF stopped  CK trending down, patient stable for discharge. CM/SW to resume homecare.
IMPROVE VTE Individual Risk Assessment        RISK                                                          Points  [  ] Previous VTE                                               3  [  ] Thrombophilia                                            2  [  ] Lower limb paresis/paralysis                    2    [  ] Active Cancer (in last 6 months)              2   [  ] Immobilization > 24 hrs                             1  [  ] ICU/CCU stay > 24 hours                            1  [  ] Age > 60                                                        1                                            Total Score __0_______  - encourage ambulation for DVT ppx
IMPROVE VTE Individual Risk Assessment        RISK                                                          Points  [  ] Previous VTE                                               3  [  ] Thrombophilia                                            2  [  ] Lower limb paresis/paralysis                    2    [  ] Active Cancer (in last 6 months)              2   [  ] Immobilization > 24 hrs                             1  [  ] ICU/CCU stay > 24 hours                            1  [  ] Age > 60                                                        1                                            Total Score __0_______  - encourage ambulation for DVT ppx

## 2019-04-23 PROBLEM — I10 ESSENTIAL (PRIMARY) HYPERTENSION: Chronic | Status: ACTIVE | Noted: 2019-04-06

## 2019-04-23 PROBLEM — F84.0 AUTISTIC DISORDER: Chronic | Status: ACTIVE | Noted: 2019-04-06

## 2019-04-24 PROBLEM — Z00.00 ENCOUNTER FOR PREVENTIVE HEALTH EXAMINATION: Status: ACTIVE | Noted: 2019-04-24

## 2019-07-30 ENCOUNTER — APPOINTMENT (OUTPATIENT)
Dept: NEUROLOGY | Facility: HOSPITAL | Age: 29
End: 2019-07-30

## 2019-12-21 ENCOUNTER — INPATIENT (INPATIENT)
Facility: HOSPITAL | Age: 29
LOS: 1 days | Discharge: HOME HEALTH | End: 2019-12-23
Attending: INTERNAL MEDICINE | Admitting: INTERNAL MEDICINE
Payer: MEDICARE

## 2019-12-21 VITALS
DIASTOLIC BLOOD PRESSURE: 78 MMHG | HEIGHT: 68 IN | OXYGEN SATURATION: 93 % | HEART RATE: 126 BPM | WEIGHT: 259.93 LBS | TEMPERATURE: 98 F | RESPIRATION RATE: 19 BRPM | SYSTOLIC BLOOD PRESSURE: 149 MMHG

## 2019-12-21 LAB
ALBUMIN SERPL ELPH-MCNC: 3.4 G/DL — SIGNIFICANT CHANGE UP (ref 3.3–5)
ALP SERPL-CCNC: 117 U/L — SIGNIFICANT CHANGE UP (ref 40–120)
ALT FLD-CCNC: 44 U/L — SIGNIFICANT CHANGE UP (ref 12–78)
ANION GAP SERPL CALC-SCNC: 6 MMOL/L — SIGNIFICANT CHANGE UP (ref 5–17)
APPEARANCE UR: CLEAR — SIGNIFICANT CHANGE UP
AST SERPL-CCNC: 27 U/L — SIGNIFICANT CHANGE UP (ref 15–37)
BACTERIA # UR AUTO: ABNORMAL
BASOPHILS # BLD AUTO: 0.05 K/UL — SIGNIFICANT CHANGE UP (ref 0–0.2)
BASOPHILS NFR BLD AUTO: 0.5 % — SIGNIFICANT CHANGE UP (ref 0–2)
BILIRUB SERPL-MCNC: 0.3 MG/DL — SIGNIFICANT CHANGE UP (ref 0.2–1.2)
BILIRUB UR-MCNC: NEGATIVE — SIGNIFICANT CHANGE UP
BUN SERPL-MCNC: 14 MG/DL — SIGNIFICANT CHANGE UP (ref 7–23)
CALCIUM SERPL-MCNC: 9.1 MG/DL — SIGNIFICANT CHANGE UP (ref 8.5–10.1)
CHLORIDE SERPL-SCNC: 109 MMOL/L — HIGH (ref 96–108)
CO2 SERPL-SCNC: 25 MMOL/L — SIGNIFICANT CHANGE UP (ref 22–31)
COLOR SPEC: YELLOW — SIGNIFICANT CHANGE UP
CREAT SERPL-MCNC: 1.47 MG/DL — HIGH (ref 0.5–1.3)
DIFF PNL FLD: NEGATIVE — SIGNIFICANT CHANGE UP
EOSINOPHIL # BLD AUTO: 0.11 K/UL — SIGNIFICANT CHANGE UP (ref 0–0.5)
EOSINOPHIL NFR BLD AUTO: 1 % — SIGNIFICANT CHANGE UP (ref 0–6)
EPI CELLS # UR: SIGNIFICANT CHANGE UP
GLUCOSE BLDC GLUCOMTR-MCNC: 96 MG/DL — SIGNIFICANT CHANGE UP (ref 70–99)
GLUCOSE SERPL-MCNC: 95 MG/DL — SIGNIFICANT CHANGE UP (ref 70–99)
GLUCOSE UR QL: NEGATIVE MG/DL — SIGNIFICANT CHANGE UP
HCT VFR BLD CALC: 44.8 % — SIGNIFICANT CHANGE UP (ref 39–50)
HGB BLD-MCNC: 13.8 G/DL — SIGNIFICANT CHANGE UP (ref 13–17)
IMM GRANULOCYTES NFR BLD AUTO: 0.5 % — SIGNIFICANT CHANGE UP (ref 0–1.5)
KETONES UR-MCNC: NEGATIVE — SIGNIFICANT CHANGE UP
LEUKOCYTE ESTERASE UR-ACNC: NEGATIVE — SIGNIFICANT CHANGE UP
LYMPHOCYTES # BLD AUTO: 2.63 K/UL — SIGNIFICANT CHANGE UP (ref 1–3.3)
LYMPHOCYTES # BLD AUTO: 24.4 % — SIGNIFICANT CHANGE UP (ref 13–44)
MCHC RBC-ENTMCNC: 23.5 PG — LOW (ref 27–34)
MCHC RBC-ENTMCNC: 30.8 GM/DL — LOW (ref 32–36)
MCV RBC AUTO: 76.3 FL — LOW (ref 80–100)
MONOCYTES # BLD AUTO: 0.92 K/UL — HIGH (ref 0–0.9)
MONOCYTES NFR BLD AUTO: 8.6 % — SIGNIFICANT CHANGE UP (ref 2–14)
NEUTROPHILS # BLD AUTO: 7 K/UL — SIGNIFICANT CHANGE UP (ref 1.8–7.4)
NEUTROPHILS NFR BLD AUTO: 65 % — SIGNIFICANT CHANGE UP (ref 43–77)
NITRITE UR-MCNC: NEGATIVE — SIGNIFICANT CHANGE UP
NRBC # BLD: 0 /100 WBCS — SIGNIFICANT CHANGE UP (ref 0–0)
PH UR: 5 — SIGNIFICANT CHANGE UP (ref 5–8)
PLATELET # BLD AUTO: 329 K/UL — SIGNIFICANT CHANGE UP (ref 150–400)
POTASSIUM SERPL-MCNC: 4.6 MMOL/L — SIGNIFICANT CHANGE UP (ref 3.5–5.3)
POTASSIUM SERPL-SCNC: 4.6 MMOL/L — SIGNIFICANT CHANGE UP (ref 3.5–5.3)
PROT SERPL-MCNC: 8.4 GM/DL — HIGH (ref 6–8.3)
PROT UR-MCNC: 30 MG/DL
RBC # BLD: 5.87 M/UL — HIGH (ref 4.2–5.8)
RBC # FLD: 16.1 % — HIGH (ref 10.3–14.5)
SODIUM SERPL-SCNC: 140 MMOL/L — SIGNIFICANT CHANGE UP (ref 135–145)
SP GR SPEC: 1.01 — SIGNIFICANT CHANGE UP (ref 1.01–1.02)
UROBILINOGEN FLD QL: NEGATIVE MG/DL — SIGNIFICANT CHANGE UP
WBC # BLD: 10.76 K/UL — HIGH (ref 3.8–10.5)
WBC # FLD AUTO: 10.76 K/UL — HIGH (ref 3.8–10.5)

## 2019-12-21 PROCEDURE — 71045 X-RAY EXAM CHEST 1 VIEW: CPT | Mod: 26

## 2019-12-21 PROCEDURE — 93010 ELECTROCARDIOGRAM REPORT: CPT

## 2019-12-21 PROCEDURE — 70450 CT HEAD/BRAIN W/O DYE: CPT | Mod: 26

## 2019-12-21 PROCEDURE — 99285 EMERGENCY DEPT VISIT HI MDM: CPT

## 2019-12-21 RX ORDER — AMLODIPINE BESYLATE 2.5 MG/1
10 TABLET ORAL DAILY
Refills: 0 | Status: DISCONTINUED | OUTPATIENT
Start: 2019-12-21 | End: 2019-12-23

## 2019-12-21 RX ORDER — DIVALPROEX SODIUM 500 MG/1
250 TABLET, DELAYED RELEASE ORAL
Refills: 0 | Status: DISCONTINUED | OUTPATIENT
Start: 2019-12-21 | End: 2019-12-23

## 2019-12-21 NOTE — H&P ADULT - ASSESSMENT
28 y/o M with a PMHx of Autism and HTN, presents to the ED after recent seizure incident. Pt was walking in a store with a family member when the family friend noticed unusual behavior, then the Pt appeared to become stiff and shaking, the family member was able to catch him and helped him to the ground without head injury.

## 2019-12-21 NOTE — ED PROVIDER NOTE - ENMT, MLM
Airway patent, Nasal mucosa clear. Mouth with normal mucosa. Throat has no vesicles, no oropharyngeal exudates and uvula is midline. Airway patent, +tongue bite

## 2019-12-21 NOTE — ED ADULT NURSE NOTE - NSIMPLEMENTINTERV_GEN_ALL_ED
Implemented All Fall with Harm Risk Interventions:  [place] to call system. Call bell, personal items and telephone within reach. Instruct patient to call for assistance. Room bathroom lighting operational. Non-slip footwear when patient is off stretcher. Physically safe environment: no spills, clutter or unnecessary equipment. Stretcher in lowest position, wheels locked, appropriate side rails in place. Provide visual cue, wrist band, yellow gown, etc. Monitor gait and stability. Monitor for mental status changes and reorient to person, place, and time. Review medications for side effects contributing to fall risk. Reinforce activity limits and safety measures with patient and family. Provide visual clues: red socks.

## 2019-12-21 NOTE — H&P ADULT - NSHPLABSRESULTS_GEN_ALL_CORE
13.0   7.78  )-----------( 285      ( 23 Dec 2019 07:13 )             42.3         142  |  110<H>  |  13  ----------------------------<  83  3.8   |  26  |  1.36<H>    Ca    8.5      23 Dec 2019 07:13    TPro  8.4<H>  /  Alb  3.4  /  TBili  0.3  /  DBili  x   /  AST  27  /  ALT  44  /  AlkPhos  117  12-21      Urinalysis Basic - ( 21 Dec 2019 18:59 )    Color: Yellow / Appearance: Clear / S.015 / pH: x  Gluc: x / Ketone: Negative  / Bili: Negative / Urobili: Negative mg/dL   Blood: x / Protein: 30 mg/dL / Nitrite: Negative   Leuk Esterase: Negative / RBC: x / WBC x   Sq Epi: x / Non Sq Epi: Occasional / Bacteria: Occasional

## 2019-12-21 NOTE — ED PROVIDER NOTE - OBJECTIVE STATEMENT
30 y/o M with a PMHx of Autism and HTN, presents to the ED after recent seizure incident. Pt was walking in a store with a family member when the family member noticed unusual behavior, then the Pt collapsed and the family member was able to catch him and helped him to the ground without head strike. He noticed that his eyes became "completely white", and his whole body stiffened, which lasted 5-10 minutes. Pt did not urinate on himself. Pt has been off of medication 4 months s/p last seizure incident in April. No head trauma, no incontinence, no back pain. 28 y/o M with a PMHx of Autism and HTN, presents to the ED after recent seizure incident. Pt was walking in a store with a family member when the family friend noticed unusual behavior, then the Pt appeared to become stiff and shaking, the family member was able to catch him and helped him to the ground without head injury. He noticed that his eyes became "completely white", and his whole body stiffened, which lasted 5-10 minutes. Pt did not urinate on himself. Pt has been off of medication 4 months s/p last seizure incident in April. No head trauma, no incontinence, no back pain. +tongue bite

## 2019-12-22 DIAGNOSIS — I10 ESSENTIAL (PRIMARY) HYPERTENSION: ICD-10-CM

## 2019-12-22 DIAGNOSIS — G40.909 EPILEPSY, UNSPECIFIED, NOT INTRACTABLE, WITHOUT STATUS EPILEPTICUS: ICD-10-CM

## 2019-12-22 DIAGNOSIS — F84.0 AUTISTIC DISORDER: ICD-10-CM

## 2019-12-22 LAB
ANION GAP SERPL CALC-SCNC: 8 MMOL/L — SIGNIFICANT CHANGE UP (ref 5–17)
BUN SERPL-MCNC: 14 MG/DL — SIGNIFICANT CHANGE UP (ref 7–23)
CALCIUM SERPL-MCNC: 9 MG/DL — SIGNIFICANT CHANGE UP (ref 8.5–10.1)
CHLORIDE SERPL-SCNC: 107 MMOL/L — SIGNIFICANT CHANGE UP (ref 96–108)
CO2 SERPL-SCNC: 25 MMOL/L — SIGNIFICANT CHANGE UP (ref 22–31)
CREAT SERPL-MCNC: 1.42 MG/DL — HIGH (ref 0.5–1.3)
GLUCOSE SERPL-MCNC: 86 MG/DL — SIGNIFICANT CHANGE UP (ref 70–99)
HCT VFR BLD CALC: 43.7 % — SIGNIFICANT CHANGE UP (ref 39–50)
HGB BLD-MCNC: 13.5 G/DL — SIGNIFICANT CHANGE UP (ref 13–17)
MCHC RBC-ENTMCNC: 23.7 PG — LOW (ref 27–34)
MCHC RBC-ENTMCNC: 30.9 GM/DL — LOW (ref 32–36)
MCV RBC AUTO: 76.7 FL — LOW (ref 80–100)
NRBC # BLD: 0 /100 WBCS — SIGNIFICANT CHANGE UP (ref 0–0)
PLATELET # BLD AUTO: 291 K/UL — SIGNIFICANT CHANGE UP (ref 150–400)
POTASSIUM SERPL-MCNC: 3.7 MMOL/L — SIGNIFICANT CHANGE UP (ref 3.5–5.3)
POTASSIUM SERPL-SCNC: 3.7 MMOL/L — SIGNIFICANT CHANGE UP (ref 3.5–5.3)
RBC # BLD: 5.7 M/UL — SIGNIFICANT CHANGE UP (ref 4.2–5.8)
RBC # FLD: 15.9 % — HIGH (ref 10.3–14.5)
SODIUM SERPL-SCNC: 140 MMOL/L — SIGNIFICANT CHANGE UP (ref 135–145)
WBC # BLD: 11.48 K/UL — HIGH (ref 3.8–10.5)
WBC # FLD AUTO: 11.48 K/UL — HIGH (ref 3.8–10.5)

## 2019-12-22 RX ADMIN — DIVALPROEX SODIUM 250 MILLIGRAM(S): 500 TABLET, DELAYED RELEASE ORAL at 17:22

## 2019-12-22 RX ADMIN — AMLODIPINE BESYLATE 10 MILLIGRAM(S): 2.5 TABLET ORAL at 06:48

## 2019-12-22 RX ADMIN — DIVALPROEX SODIUM 250 MILLIGRAM(S): 500 TABLET, DELAYED RELEASE ORAL at 06:48

## 2019-12-22 NOTE — PROGRESS NOTE ADULT - SUBJECTIVE AND OBJECTIVE BOX
Patient is a 29y old  Male who presents with a chief complaint of seizure (22 Dec 2019 13:41)      INTERVAL HPI/OVERNIGHT EVENTS:  no new episode  MEDICATIONS  (STANDING):  amLODIPine   Tablet 10 milliGRAM(s) Oral daily  diVALproex  milliGRAM(s) Oral two times a day    MEDICATIONS  (PRN):      Allergies    No Known Allergies    Intolerances        REVIEW OF SYSTEMS:  CONSTITUTIONAL: No fever, weight loss, or fatigue  EYES: No eye pain, visual disturbances, or discharge  ENMT:  No difficulty hearing, tinnitus, vertigo; No sinus or throat pain  NECK: No pain or stiffness  BREASTS: No pain, masses, or nipple discharge  RESPIRATORY: No cough, wheezing, chills or hemoptysis; No shortness of breath  CARDIOVASCULAR: No chest pain, palpitations, dizziness, or leg swelling  GASTROINTESTINAL: No abdominal or epigastric pain. No nausea, vomiting, or hematemesis; No diarrhea or constipation. No melena or hematochezia.  GENITOURINARY: No dysuria, frequency, hematuria, or incontinence  NEUROLOGICAL: No headaches, memory loss, loss of strength, numbness, or tremors  SKIN: No itching, burning, rashes, or lesions   LYMPH NODES: No enlarged glands  ENDOCRINE: No heat or cold intolerance; No hair loss  MUSCULOSKELETAL: No joint pain or swelling; No muscle, back, or extremity pain  PSYCHIATRIC: No depression, anxiety, mood swings, or difficulty sleeping  HEME/LYMPH: No easy bruising, or bleeding gums  ALLERGY AND IMMUNOLOGIC: No hives or eczema    Vital Signs Last 24 Hrs  T(C): 36.9 (22 Dec 2019 17:24), Max: 37.1 (21 Dec 2019 18:32)  T(F): 98.4 (22 Dec 2019 17:24), Max: 98.7 (21 Dec 2019 18:32)  HR: 93 (22 Dec 2019 17:24) (83 - 101)  BP: 152/99 (22 Dec 2019 17:24) (125/86 - 152/99)  BP(mean): --  RR: 17 (22 Dec 2019 17:24) (16 - 18)  SpO2: 100% (22 Dec 2019 17:24) (98% - 100%)    PHYSICAL EXAM:  GENERAL: NAD, well-groomed, well-developed  HEAD:  Atraumatic, Normocephalic  EYES: EOMI, PERRLA, conjunctiva and sclera clear  ENMT: No tonsillar erythema, exudates, or enlargement; Moist mucous membranes, Good dentition, No lesions  NECK: Supple, No JVD, Normal thyroid  NERVOUS SYSTEM:  Alert & Oriented X3, Good concentration; Motor Strength 5/5 B/L upper and lower extremities; DTRs 2+ intact and symmetric  CHEST/LUNG: Clear to percussion bilaterally; No rales, rhonchi, wheezing, or rubs  HEART: Regular rate and rhythm; No murmurs, rubs, or gallops  ABDOMEN: Soft, Nontender, Nondistended; Bowel sounds present  EXTREMITIES:  2+ Peripheral Pulses, No clubbing, cyanosis, or edema  LYMPH: No lymphadenopathy noted  SKIN: No rashes or lesions    LABS:                        13.5   11.48 )-----------( 291      ( 22 Dec 2019 08:03 )             43.7     12    140  |  107  |  14  ----------------------------<  86  3.7   |  25  |  1.42<H>    Ca    9.0      22 Dec 2019 08:03    TPro  8.4<H>  /  Alb  3.4  /  TBili  0.3  /  DBili  x   /  AST  27  /  ALT  44  /  AlkPhos  117  12-21      Urinalysis Basic - ( 21 Dec 2019 18:59 )    Color: Yellow / Appearance: Clear / S.015 / pH: x  Gluc: x / Ketone: Negative  / Bili: Negative / Urobili: Negative mg/dL   Blood: x / Protein: 30 mg/dL / Nitrite: Negative   Leuk Esterase: Negative / RBC: x / WBC x   Sq Epi: x / Non Sq Epi: Occasional / Bacteria: Occasional      CAPILLARY BLOOD GLUCOSE        CULTURES:    HEMOGLOBIN A1C:    CHOLESTEROL:        RADIOLOGY & ADDITIONAL TESTS:

## 2019-12-22 NOTE — CONSULT NOTE ADULT - SUBJECTIVE AND OBJECTIVE BOX
Subjective Complaints:  Historian:       Consult requested by ER doctor:                  Attending: DR Reynaga    HPI: 29 y o man with h/o autism admitted with after an episode of seizure ,patient has an h/o seizure disorder and apparently stopped his medication last march ,he also has an h/o of htn     DOMSYDNEY BRENNAN    PAST MEDICAL & SURGICAL HISTORY:  Hypertension  Autism  No significant past surgical history  29yMale    MEDICATIONS  (STANDING):  amLODIPine   Tablet 10 milliGRAM(s) Oral daily  diVALproex  milliGRAM(s) Oral two times a day    MEDICATIONS  (PRN):      Allergies    No Known Allergies    Intolerances      FAMILY HISTORY:  Family history of hypertension: Father      REVIEW OF SYSTEMS:  General:  No wt loss, fevers, chills, night sweats  Eyes:  Good vision, no reported pain  ENT:  No sore throat, pain, runny nose, dysphagia  CV:  No pain, palpitatioins, hypo/hypertension  Resp:  No dyspnea, cough, tachypnea, wheezing  GI:  No pain, nausea, vomiting, diarrhea, constipatiion  :  No pain, bleeding, incontinence, nocturia  Muscle:  No pain, weakness  Breast:  No pain, abscess, mass, discharge  Neuro:  No weakness, tingling, memory problems  Psych:  No fatigue, insomnia, mood problems, depression  Endocrine:  No polyuria, polydypsia, cold/heat intolerance  Heme:  No petechiae, ecchymosis, easy bruisability  Skin:  No rash, tattoos, scars, edema      Vital Signs Last 24 Hrs  T(C): 36.1 (22 Dec 2019 11:33), Max: 37.1 (21 Dec 2019 18:32)  T(F): 97 (22 Dec 2019 11:33), Max: 98.7 (21 Dec 2019 18:32)  HR: 90 (22 Dec 2019 11:33) (83 - 126)  BP: 125/86 (22 Dec 2019 11:33) (125/86 - 149/87)  BP(mean): --  RR: 16 (22 Dec 2019 11:33) (16 - 19)  SpO2: 99% (22 Dec 2019 11:33) (93% - 100%)    GENERAL PHYSICAL EXAM:  General:  Appears stated age, well-groomed, well-nourished, no distress  HEENT:  NC/AT, patent nares w/ pink mucosa, OP clear w/o lesions, PERRL, EOMI, conjunctivae clear, no thyromegaly, nodules, adenopathy, no JVD  Chest:  Full & symmetric excursion, no increased effort, breath sounds clear  Cardiovascular:  Regular rhythm, S1, S2, no murmur/rub/S3/S4, no carotid/femoral/abdominal bruit, radial/pedal pulses 2+, no edema  Abdomen:  Soft, non-tender, non-distended, normoactive bowel sounds, no HSM  Extremities:  Gait & station:   Digits:   Nails:   Joints, Bones, Muscles:   ROM:   Stability:  Skin:  No rash/erythema/ecchymoses/petechiae/wounds/abscess/warm/dry  Musculoskeletal:  Full ROM in all joints w/o swelling/tenderness/effusion    NEUROLOGICAL EXAM:  HENT:  Normocephalic head; atraumatic head.  Neck supple.  ENT: normal looking.  Mental State:    Awake , oriented to person, place   Cognitive function is impaired ,no speech production   Cooperative.     Cranial Nerves:  II-XII:   Pupils round and reactive to light and accommodation.  Extraocular movements full.  Visual fields full (no homonymous hemianopsia).  Visual acuity wnl.  Facial symmetry intact.  Tongue midline.  Motor Functions:  Moves all extremities.  No pronator drift of UE. motor strength is 5/5     Sensory Functions: unreliable    Reflexes:  Deep tendon reflexes normoactive to biceps, knees and ankles. plantar responses are flexor   Cerebellar Testing:    Finger to nose intact.  Nystagmus absent.  GAIT : UNREMARKABLE     LABS:                        13.5   11.48 )-----------( 291      ( 22 Dec 2019 08:03 )             43.7     12-    140  |  107  |  14  ----------------------------<  86  3.7   |  25  |  1.42<H>    Ca    9.0      22 Dec 2019 08:03    TPro  8.4<H>  /  Alb  3.4  /  TBili  0.3  /  DBili  x   /  AST  27  /  ALT  44  /  AlkPhos  117  12-21        Urinalysis Basic - ( 21 Dec 2019 18:59 )    Color: Yellow / Appearance: Clear / S.015 / pH: x  Gluc: x / Ketone: Negative  / Bili: Negative / Urobili: Negative mg/dL   Blood: x / Protein: 30 mg/dL / Nitrite: Negative   Leuk Esterase: Negative / RBC: x / WBC x   Sq Epi: x / Non Sq Epi: Occasional / Bacteria: Occasional        RADIOLOGY & ADDITIONAL STUDIES:    POCT  Blood Glucose: 16:14 ( @ 16:15)  Complete Blood Count + Automated Diff: STAT ( @ 16:54)  Comprehensive Metabolic Panel: STAT ( @ 16:54)  Fall Risk Protocol:     Time/Priority:  STAT ( @ 16:54)  Cardiac Monitor Bedside:     Time/Priority:  STAT ( @ 16:54)  12 Lead ECG:   Provider's Contact #: [phone] ( @ 16:54)  Urinalysis: STAT ( @ 16:54)  CT Head No Cont: Urgent   Indication: seizure  Transport: Stretcher-Crib  Exam Completed  Provider's Contact #: [phone] ( @ 17:36)  Xray Chest 1 View- PORTABLE-Urgent: Urgent   Indication: r/o infiltrate  Transport: Portable  Exam Completed  Provider's Contact #: [phone] ( @ 17:36)  Admit to Inpatient Level of Care.:     Service:  TELEMETRY    Physician:  Dinh Reynaga    Additional Instructions:  Diagnosis: Seizure disorder; Autism  Isolation: None  Special Consideration: None ( @ 18:28)  Admit from ED ( @ 18:44)  Urine Microscopic-Add On (NC): 18:55 ( @ 19:02)  Admit to Inpatient Level of Care.:     LVS 2D    Service:  Telemetry    Physician:  Dr Reynaga ( @ 19:06)  Notify Provider For:     Additional Instructions:  Seizure. ( @ 19:06)  Seizure Precautions:     Time/Priority:  Routine ( @ 19:06)  Fall Risk Protocol:     Time/Priority:  Routine ( @ 19:06)  Pulse Oximetry:   Frequency: <Continuous>    Additional Instructions:  Monitor pulse oximetry after seizure occurence until oxygen saturation is greater than 95%. ( @ 19:06)  Diet, Regular ( @ 19:06)  Complete Blood Count: AM Sched. Collection: 22-Dec-2019 05:00 ( @ 19:06)  Complete Blood Count: AM Sched. Collection: 22-Dec-2019 05:00  Cancel Reason: Dup Cancel ( @ 19:06)  Basic Metabolic Panel: AM Sched. Collection: 22-Dec-2019 05:00 ( @ 19:06)  diVALproex DR: [Known as DepaKOTE]  250 milliGRAM(s), Oral, two times a day  Administration Instructions: Swallow whole * don't crush/chew  This is a Look-alike/Sound-alike Medication  CAUTION: HAZARDOUS DRUG  Provider's Contact #: [phone] ( @ 19:07)  amLODIPine   Tablet: [Known as NORVASC]  10 milliGRAM(s), Oral, daily  Special Instructions: hold for sbp < 120  Administration Instructions: This is a Look-alike/Sound-alike Medication  Provider's Contact #: [phone] ( @ 19:07)  Provider to RN:       pt can go to the floor ( @ 19:17)  Consult- Social Work, Adult:    Reason for Consult: Significantly altered cognitive ability  Stairs to enter home  Stairs within home ( @ 22:18)      Assessment & Opinion: 29 Y O MAN ,SEEN FOR EVALUATION BECAUSE OF AN H/O SEIZURE   DX SEIZURE DISORDER --AUTISM     Recommendations:  EEG.   DVT prophylaxis as ordered.  Medications:  VALPROIC ACID WAS RESTARTED

## 2019-12-23 ENCOUNTER — TRANSCRIPTION ENCOUNTER (OUTPATIENT)
Age: 29
End: 2019-12-23

## 2019-12-23 VITALS
HEART RATE: 87 BPM | RESPIRATION RATE: 17 BRPM | TEMPERATURE: 98 F | DIASTOLIC BLOOD PRESSURE: 82 MMHG | OXYGEN SATURATION: 99 % | SYSTOLIC BLOOD PRESSURE: 115 MMHG

## 2019-12-23 LAB
ANION GAP SERPL CALC-SCNC: 6 MMOL/L — SIGNIFICANT CHANGE UP (ref 5–17)
BUN SERPL-MCNC: 13 MG/DL — SIGNIFICANT CHANGE UP (ref 7–23)
CALCIUM SERPL-MCNC: 8.5 MG/DL — SIGNIFICANT CHANGE UP (ref 8.5–10.1)
CHLORIDE SERPL-SCNC: 110 MMOL/L — HIGH (ref 96–108)
CO2 SERPL-SCNC: 26 MMOL/L — SIGNIFICANT CHANGE UP (ref 22–31)
CREAT SERPL-MCNC: 1.36 MG/DL — HIGH (ref 0.5–1.3)
GLUCOSE SERPL-MCNC: 83 MG/DL — SIGNIFICANT CHANGE UP (ref 70–99)
HCT VFR BLD CALC: 42.3 % — SIGNIFICANT CHANGE UP (ref 39–50)
HGB BLD-MCNC: 13 G/DL — SIGNIFICANT CHANGE UP (ref 13–17)
MCHC RBC-ENTMCNC: 23.7 PG — LOW (ref 27–34)
MCHC RBC-ENTMCNC: 30.7 GM/DL — LOW (ref 32–36)
MCV RBC AUTO: 77.2 FL — LOW (ref 80–100)
NRBC # BLD: 0 /100 WBCS — SIGNIFICANT CHANGE UP (ref 0–0)
PLATELET # BLD AUTO: 285 K/UL — SIGNIFICANT CHANGE UP (ref 150–400)
POTASSIUM SERPL-MCNC: 3.8 MMOL/L — SIGNIFICANT CHANGE UP (ref 3.5–5.3)
POTASSIUM SERPL-SCNC: 3.8 MMOL/L — SIGNIFICANT CHANGE UP (ref 3.5–5.3)
RBC # BLD: 5.48 M/UL — SIGNIFICANT CHANGE UP (ref 4.2–5.8)
RBC # FLD: 16.2 % — HIGH (ref 10.3–14.5)
SODIUM SERPL-SCNC: 142 MMOL/L — SIGNIFICANT CHANGE UP (ref 135–145)
WBC # BLD: 7.78 K/UL — SIGNIFICANT CHANGE UP (ref 3.8–10.5)
WBC # FLD AUTO: 7.78 K/UL — SIGNIFICANT CHANGE UP (ref 3.8–10.5)

## 2019-12-23 RX ADMIN — DIVALPROEX SODIUM 250 MILLIGRAM(S): 500 TABLET, DELAYED RELEASE ORAL at 06:19

## 2019-12-23 RX ADMIN — AMLODIPINE BESYLATE 10 MILLIGRAM(S): 2.5 TABLET ORAL at 06:19

## 2019-12-23 NOTE — DISCHARGE NOTE PROVIDER - NSDCMRMEDTOKEN_GEN_ALL_CORE_FT
amLODIPine 10 mg oral tablet: 1 tab(s) orally once a day  divalproex sodium 250 mg oral delayed release tablet: 1 tab(s) orally 2 times a day

## 2019-12-23 NOTE — DISCHARGE NOTE PROVIDER - NSDCCPCAREPLAN_GEN_ALL_CORE_FT
PRINCIPAL DISCHARGE DIAGNOSIS  Diagnosis: Seizure disorder  Assessment and Plan of Treatment:       SECONDARY DISCHARGE DIAGNOSES  Diagnosis: Essential hypertension  Assessment and Plan of Treatment: Essential hypertension    Diagnosis: Autism  Assessment and Plan of Treatment:

## 2019-12-23 NOTE — DISCHARGE NOTE PROVIDER - CARE PROVIDER_API CALL
Dinh Huddleston (DO)  Internal Medicine  [address] [place] [address]  Phone: [phone]  Fax: [phone]  Follow Up Time:

## 2019-12-23 NOTE — DISCHARGE NOTE NURSING/CASE MANAGEMENT/SOCIAL WORK - PATIENT PORTAL LINK FT
You can access the FollowMyHealth Patient Portal offered by [facility] by registering at the following website: [email or web address]. By joining [employer]’s FollowMyHealth portal, you will also be able to view your health information using other applications (apps) compatible with our system.

## 2019-12-29 DIAGNOSIS — F84.0 AUTISTIC DISORDER: ICD-10-CM

## 2019-12-29 DIAGNOSIS — R41.9 UNSPECIFIED SYMPTOMS AND SIGNS INVOLVING COGNITIVE FUNCTIONS AND AWARENESS: ICD-10-CM

## 2019-12-29 DIAGNOSIS — I10 ESSENTIAL (PRIMARY) HYPERTENSION: ICD-10-CM

## 2019-12-29 DIAGNOSIS — G40.909 EPILEPSY, UNSPECIFIED, NOT INTRACTABLE, WITHOUT STATUS EPILEPTICUS: ICD-10-CM

## 2019-12-29 DIAGNOSIS — Z91.14 PATIENT'S OTHER NONCOMPLIANCE WITH MEDICATION REGIMEN: ICD-10-CM

## 2019-12-29 DIAGNOSIS — F09 UNSPECIFIED MENTAL DISORDER DUE TO KNOWN PHYSIOLOGICAL CONDITION: ICD-10-CM

## 2023-05-16 NOTE — ED ADULT NURSE NOTE - NSFALLRSKHRMRISKTYPE_ED_ALL_ED
on the discharge service for the patient. I have reviewed and made amendments to the documentation where necessary. bones(Osteoporosis,prev fx,steroid use,metastatic bone ca)

## 2024-02-17 NOTE — H&P ADULT - CARDIOVASCULAR DETAILS
MiraLAX 1-2 caps twice daily x2 days then 1/2 cap/day thereafter.    Return to this emergency room immediately if symptoms persist, worsen or if new ones form.    Make sure you follow-up with your pediatrician within the next 1-2 business days.   positive S2/positive S1

## 2024-05-21 NOTE — ED ADULT NURSE NOTE - MATERNAL ILLNESS
Pt arrived ambulatory for first TP, reviewed plan of care, verbalized understanding and wishes to proceed.  Pt states he ate lunch prior to arrival, given water and apple juice.  PIV started in LAC, labs drawn.  Results reviewed, meets parameters for TP.  500cc blood removed over 15 minutes, tolerated well, no issues noted.  Pt sat for 15 minutes after, VS sitting/standing rechecked and WNL, okay for DC.  No f/u appt needed at this time, pt will f/u with Dr. Encinas to discuss treatment plan and return after that time if more TP needed.  Reviewed ferritin level with pt prior to DC, happy with results, states he has been abstaining from alcohol since last draw, has dropped significantly.   
None